# Patient Record
Sex: FEMALE | Race: WHITE
[De-identification: names, ages, dates, MRNs, and addresses within clinical notes are randomized per-mention and may not be internally consistent; named-entity substitution may affect disease eponyms.]

---

## 2017-03-07 ENCOUNTER — HOSPITAL ENCOUNTER (EMERGENCY)
Dept: HOSPITAL 62 - ER | Age: 27
Discharge: HOME | End: 2017-03-07
Payer: MEDICAID

## 2017-03-07 VITALS — DIASTOLIC BLOOD PRESSURE: 79 MMHG | SYSTOLIC BLOOD PRESSURE: 118 MMHG

## 2017-03-07 DIAGNOSIS — J02.9: Primary | ICD-10-CM

## 2017-03-07 DIAGNOSIS — Z88.6: ICD-10-CM

## 2017-03-07 DIAGNOSIS — F17.210: ICD-10-CM

## 2017-03-07 DIAGNOSIS — H92.02: ICD-10-CM

## 2017-03-07 DIAGNOSIS — Z88.8: ICD-10-CM

## 2017-03-07 DIAGNOSIS — Z88.0: ICD-10-CM

## 2017-03-07 DIAGNOSIS — R05: ICD-10-CM

## 2017-03-07 DIAGNOSIS — R68.83: ICD-10-CM

## 2017-03-07 DIAGNOSIS — Z88.2: ICD-10-CM

## 2017-03-07 PROCEDURE — 99282 EMERGENCY DEPT VISIT SF MDM: CPT

## 2017-03-07 NOTE — ER DOCUMENT REPORT
ED General





- General


Chief Complaint: Sore Throat


Stated Complaint: THROAT PAIN


Time seen by provider: 18:20


Mode of Arrival: Ambulatory


Information source: Patient


Notes: 


26-year-old female with 2 day history of subjective fever chills sore throat 

and pain with swallowing.  She reports that she occasionally coughs up some 

material it's a mixture of her clot and white purulent material.  She also 

reports left ear pain for the past 2 days.  Patient denies nausea, vomiting, 

diarrhea, chest pain, abdominal pain, back pain, or dysuria.


Physical Exam:





General: Alert, appears well. 





HEENT: Normocephalic. Atraumatic. PERRLA. Extraocular movements intact left 

tympanic membrane has slight erythema but good landmarks.  Right tympanic 

membranes clear.  Canals clear bilaterally. Oropharynx has bright erythema 

tonsillar swelling and white exudate bilaterally.  No stridor horses drooling.





Neck: Supple.  Mildly tender adenopathy bilaterally no nuchal rigidity





Respiratory: No respiratory distress. Clear and equal breath sounds bilaterally.





Cardiovascular: Regular rate and rhythm. 





Abdominal: Normal Inspection. Soft, non-tender. No distension. Normal Bowel 

Sounds. 





Back: Non-tender. No deformity or step off.





Extremities: Moves all four extremities.


Upper extremities: Normal inspection. Non-tender. Normal color. Normal ROM. 

Normal temperature. 


Lower extremities: Normal inspection. Non-tender. No edema. Normal color. 

Normal ROM. Normal temperature. 





Neurological: Speech clear mentation normal





Psychological: Normal affect. Normal Mood. 





Skin: Warm. Dry. Normal color.


TRAVEL OUTSIDE OF THE [place] IN LAST 30 DAYS: No





- Related Data


Allergies/Adverse Reactions: 


 





aspirin [Aspirin] Allergy (Intermediate, Verified 12/01/15 13:07)


 Facial swelling


diphenhydramine HCl [From Benadryl] Allergy (Intermediate, Verified 12/01/15 13:

07)


 Facial swelling


Sulfa (Sulfonamide Antibiotics) Allergy (Verified 12/01/15 13:07)


 unknown











Past Medical History





- General


Information source: Patient





- Social History


Smoking Status: Current Every Day Smoker


Family History: Hypertension, Malignancy, Thyroid Disfunction, Other - kidney 

disease


Patient has suicidal ideation: No


Patient has homicidal ideation: No





- Past Medical History


Cardiac Medical History: Reports: Hx Hypertension - with pregnancy, Other - 

Patient reports being told she has an enlarged heart and is having further 

follow-up of that with her physician but she doesn't know her diagnosis


Pulmonary Medical History: 


   Denies: Hx Tuberculosis


Renal/ Medical History: Reports: Hx Kidney Stones.  Denies: Hx Peritoneal 

Dialysis


GI Medical History: Reports: Hx Gastroesophageal Reflux Disease


Musculoskeltal Medical History: Reports Hx Musculoskeletal Trauma


Psychiatric Medical History: Reports: Hx Anxiety, Hx Bipolar Disorder


   Denies: Hx Depression


Past Surgical History: Reports: Hx  Section, Hx Gynecologic Surgery, Hx 

Kidney (Renal Surgery)





- Immunizations


Immunizations up to date: Yes


Hx Diphtheria, Pertussis, Tetanus Vaccination: Yes


Hx Pneumococcal Vaccination: 10/01/14





Review of Systems





- Review of Systems


Constitutional: See HPI


EENT: See HPI


Cardiovascular: denies: Chest pain


Respiratory: See HPI


Gastrointestinal: denies: Abdominal pain, Diarrhea, Nausea, Vomiting


Genitourinary: denies: Burning, Dysuria


Female Genitourinary: denies: Pregnant


Musculoskeletal: denies: Back pain


Skin: denies: Rash


Hematologic/Lymphatic: Enlarged lymph nodes, Swollen glands


Neurological/Psychological: denies: Weakness, Numbness





Physical Exam





- Vital signs


Vitals: 


 











Temp Pulse Resp BP Pulse Ox


 


 98.4 F   104 H  14   124/86 H  100 


 


 17 16:54  17 16:54  17 16:54  17 16:54  17 16:54














Course





- Re-evaluation


Re-evalutation: 





17 18:37


Presentation is consistent with acute pharyngitis and given her reported 

cardiac history with treat her with antibiotics even if the strep test was 

negative.  She reports an allergy to penicillin





- Vital Signs


Vital signs: 


 











Temp Pulse Resp BP Pulse Ox


 


 98.4 F   104 H  14   124/86 H  100 


 


 17 16:54  17 16:54  17 16:54  17 16:54  17 16:54














Discharge





- Discharge


Clinical Impression: 


Pharyngitis


Qualifiers:


 Pharyngitis/tonsillitis etiology: unspecified etiology Qualified Code(s): 

J02.9 - Acute pharyngitis, unspecified





Condition: Stable


Disposition: HOME, SELF-CARE


Instructions:  Sore Throat ([facility])


Additional Instructions: 


Follow-up with your physician next week for recheck


Prescriptions: 


Azithromycin [Zithromax 250 mg Tablet] 250 mg PO ASDIR PRN #6 tablet


 PRN Reason:

## 2017-03-07 NOTE — ER DOCUMENT REPORT
ED Medical Screen (RME)





- General


Chief Complaint: Sore Throat


Stated Complaint: THROAT PAIN


Time seen by provider: 17:06


Mode of Arrival: Ambulatory


Information source: Patient


Notes: 


26-year-old female complaining of right-sided sore throat with painful 

swallowing x 2 days  She has also had a fever.


Exudative right tonsil No periTonsillar swelling.





I have greeted and performed a rapid initial assessment of this patient.  A 

comprehensive ED assessment, evaluation of the patient, analysis of test results

, and completion of the medical decision making process will be conducted by 

additional ED providers.


TRAVEL OUTSIDE OF THE [place] IN LAST 30 DAYS: No





- Related Data


Allergies/Adverse Reactions: 


 





aspirin [Aspirin] Allergy (Intermediate, Verified 12/01/15 13:07)


 Facial swelling


diphenhydramine HCl [From Benadryl] Allergy (Intermediate, Verified 12/01/15 13:

07)


 Facial swelling


Sulfa (Sulfonamide Antibiotics) Allergy (Verified 12/01/15 13:07)


 unknown











Past Medical History





- Past Medical History


Cardiac Medical History: Reports: Hx Hypertension - with pregnancy


Pulmonary Medical History: 


   Denies: Hx Tuberculosis


Renal/ Medical History: Reports: Hx Kidney Stones


GI Medical History: Reports: Hx Gastroesophageal Reflux Disease


Musculoskeltal Medical History: Reports Hx Musculoskeletal Trauma


Psychiatric Medical History: Reports: Hx Anxiety, Hx Bipolar Disorder


   Denies: Hx Depression


Past Surgical History: Reports: Hx  Section, Hx Gynecologic Surgery, Hx 

Kidney (Renal Surgery)





- Immunizations


Immunizations up to date: Yes


Hx Diphtheria, Pertussis, Tetanus Vaccination: Yes





Physical Exam





- Vital signs


Vitals: 





 











Temp Pulse Resp BP Pulse Ox


 


 98.4 F   104 H  14   124/86 H  100 


 


 17 16:54  17 16:54  17 16:54  17 16:54  17 16:54














Course





- Vital Signs


Vital signs: 





 











Temp Pulse Resp BP Pulse Ox


 


 98.4 F   104 H  14   124/86 H  100 


 


 17 16:54  17 16:54  17 16:54  17 16:54  17 16:54
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
## 2017-06-01 NOTE — ER DOCUMENT REPORT
HPI





- HPI


Patient complains to provider of: toothache


Pain Level: 3


Context: 


 18-year-old female presents with fracture to 3 days ago with severe pain has 

been taking 800 mg of Motrin every 8 hours with minimal relief in her symptoms.

  Patient denies any fever, chills, drainage, foul odor, aphasia, dyspnea, 

inability to tolerate p.o. fluids or solids. NKDA to PCN





- CARDIOVASCULAR


Cardiovascular: DENIES: Chest pain





- REPRODUCTIVE


LMP: 17


Reproductive: DENIES: Pregnant:





- DERM


Skin Color: Normal, Pink





Past Medical History





- Social History


Smoking Status: Current Every Day Smoker


Chew tobacco use (# tins/day): No


Frequency of alcohol use: None


Drug Abuse: None


Family History: Hypertension, Malignancy, Thyroid Disfunction, Other - kidney 

disease


Patient has suicidal ideation: No


Patient has homicidal ideation: No





- Past Medical History


Cardiac Medical History: Reports: Hx Hypertension - with pregnancy


Pulmonary Medical History: 


   Denies: Hx Tuberculosis


Renal/ Medical History: Reports: Hx Kidney Stones.  Denies: Hx Peritoneal 

Dialysis


GI Medical History: Reports: Hx Gastroesophageal Reflux Disease


Musculoskeltal Medical History: Reports Hx Musculoskeletal Trauma


Psychiatric Medical History: Reports: Hx Anxiety, Hx Bipolar Disorder


   Denies: Hx Depression


Past Surgical History: Reports: Hx  Section, Hx Gynecologic Surgery, Hx 

Kidney (Renal Surgery)





- Immunizations


Immunizations up to date: Yes


Hx Diphtheria, Pertussis, Tetanus Vaccination: Yes


Hx Pneumococcal Vaccination: 10/01/14





Vertical Provider Document





- CONSTITUTIONAL


Agree With Documented VS: Yes


Exam Limitations: No Limitations


General Appearance: WD/WN, No Apparent Distress





- INFECTION CONTROL


TRAVEL OUTSIDE OF THE [place] IN LAST 30 DAYS: No





- HEENT


HEENT: Atraumatic, Normal ENT Exam, Normocephalic


Mouth Diagram: 


  __________________________














  __________________________





 1 - fx down to nerve root F-15 no evidence of abscess





Notes: 


Uvula midline.  Airway patent. No evidence of tonsillar enlargement, 

peritonsillar abscess, retropharyngeal abscess.





- NECK


Neck: Normal Inspection, Other - no Evidence of Willard's angina.  negative: 

Lymphadenopathy-Left, Lymphadenopathy-Right





- RESPIRATORY


Respiratory: Breath Sounds Normal, No Respiratory Distress, Chest Non-Tender


O2 Sat by Pulse Oximetry: 100





- CARDIOVASCULAR


Cardiovascular: Regular Rate, Regular Rhythm, No Murmur





Course





- Re-evaluation


Re-evalutation: 





17 07:30


Received a 5 cc submental Sensorcaine block with complete resolution of her 

symptoms and no complications.  No evidence of peritonsillar retropharyngeal 

abscess.  Receiving prescription for p.o. antibiotics and instruction to follow-

up with dentist





- Vital Signs


Vital signs: 


 











Temp Pulse Resp BP Pulse Ox


 


 98.3 F   74   18   124/84   100 


 


 17 19:47  17 19:47  17 19:47  17 19:47  17 19:47














Procedures





- Additional Procedures


  ** dental block


Additional Procedures: Other - dental block 5 cc sensorcaine buccal block for 

tooth 15





Discharge





- Discharge


Clinical Impression: 


 Tooth ache





Condition: Good


Disposition: HOME, SELF-CARE


Instructions:  [facility], Penicillin V K ([facility]), Oral Narcotic 

Medication ([facility]), Toothache ([facility])


Additional Instructions: 


[facility]


[address], [place]


[phone]


Friday mornings, by appointment





[facility]


[address] [place] [address] [phone]





[facility]


[address]


[phone]





[facility]


[address] [facility]


[phone]





[facility]


[address]


[phone]


[email or web address]





[facility]


[address] [place]  [address] [phone]


Monday-  8:00am to 5:00 pm


Will see patients from other [facility].


Charges based on income and family size and


accepts Medicare, Medicaid, and Insurances


Will pull molars





[facility]


Student Clinics


[facility] [address] [phone]


Hours of Operation


   8:00 am - 4:30 pm weekdays





The following dental offices accept Medicaid:





   [facility]   [phone]


   Dr. Langford         [phone]


   Dr. Flowers         [phone]


   Dr. Sprague         [phone]


   Dr. Arrington         [phone]


   [facility] [address] [place] [place]


      oral surgery      [phone]


   Dr. Soto ([place])      [phone]


   Dr. Madrid ([place])   [phone]


   [facility]      [phone]


   [facility] ([place])   [phone]


   Dr. Saleem ([place])      [phone]


   [facility]      [phone]


   [facility]   [phone]


   [facility]   [phone]


   Dr. Feliciano ([place])      [phone]


   [facility] 


      ([place])      [phone]





   Medicaid Care Line      [phone]


Prescriptions: 


Oxycodone HCl/Acetaminophen [Percocet 5-325 mg Tablet] 1 - 2 tab PO Q4H PRN #15 

tablet


 PRN Reason: 


Penicillin V Potassium [Penicillin Vk 500 mg Tablet] 500 mg PO BID #20 tablet


Referrals: 


LAYLA AGUIRRE MD [Primary Care Provider] - Follow up as needed

## 2017-08-06 NOTE — ER DOCUMENT REPORT
ED ENT





- General


Chief Complaint: throat pain


Stated Complaint: THROAT PAIN,FEVER


Time Seen by Provider: 17 13:23


Notes: 


Patient is a  20 week pregnant 26-year-old female who presents emergency 

department complaining of sore throat for the past 2 days.  Patient admits to 

difficulty swallowing and swollen throat for the past 2 days.  Patient states 

that she has not seen her primary care provider regarding this.  Denies any 

sick contacts at home.  States she is able to tolerate swallowing liquids but 

solids are too painful.  Denies any difficulty breathing.  Has been trying to 

take Tylenol but cannot swallow the pills.  Otherwise patient is on Suboxone 

through pain management.


TRAVEL OUTSIDE OF THE [place] IN LAST 30 DAYS: No





- Related Data


Allergies/Adverse Reactions: 


 





aspirin [Aspirin] Allergy (Intermediate, Verified 17 12:46)


 Facial swelling


diphenhydramine HCl [From Benadryl] Allergy (Intermediate, Verified 17 12:

46)


 Facial swelling


Sulfa (Sulfonamide Antibiotics) Allergy (Verified 17 12:46)


 unknown











Past Medical History





- Social History


Smoking Status: Current Every Day Smoker


Family History: Hypertension, Malignancy, Thyroid Disfunction, Other - kidney 

disease


Patient has suicidal ideation: No


Patient has homicidal ideation: No





- Past Medical History


Cardiac Medical History: Reports: Hx Hypertension - with pregnancy


Pulmonary Medical History: 


   Denies: Hx Tuberculosis


Renal/ Medical History: Reports: Hx Kidney Stones.  Denies: Hx Peritoneal 

Dialysis


GI Medical History: Reports: Hx Gastroesophageal Reflux Disease


Musculoskeltal Medical History: Reports Hx Musculoskeletal Trauma


Psychiatric Medical History: Reports: Hx Anxiety, Hx Bipolar Disorder


   Denies: Hx Depression


Past Surgical History: Reports: Hx  Section, Hx Gynecologic Surgery, Hx 

Kidney (Renal Surgery)





- Immunizations


Immunizations up to date: Yes


Hx Diphtheria, Pertussis, Tetanus Vaccination: Yes


Hx Pneumococcal Vaccination: 10/01/14





Review of Systems





- Review of Systems


Constitutional: No symptoms reported


EENT: See HPI


Cardiovascular: No symptoms reported


Respiratory: No symptoms reported


Gastrointestinal: No symptoms reported


Genitourinary: No symptoms reported


Female Genitourinary: No symptoms reported


Musculoskeletal: No symptoms reported


-: Yes All other systems reviewed and negative





Physical Exam





- Vital signs


Vitals: 


 











Temp Pulse Resp BP Pulse Ox


 


 98.9 F   109 H  16   137/70 H  100 


 


 17 12:40  17 12:40  17 12:40  17 12:40  17 12:40














- Notes


Notes: 


PHYSICAL EXAM


GENERAL: Alert, interacts well. 


HEAD: Normocephalic, atraumatic.


EYES: Pupils equal, round, and reactive to light. Extraocular movements intact.


ENT: Oral mucosa moist, tongue midline.  Uvula midline.  Evidence of pharyngeal 

erythema with tonsillar exudates.  Airway patent. No evidence of tonsillar 

enlargement, peritonsillar abscess, retropharyngeal abscess.


NECK: Full range of motion. Supple. Trachea midline.


LUNGS: Clear to auscultation bilaterally, no wheezes, rales, or rhonchi. No 

respiratory distress.


HEART: Regular rate and rhythm. No murmurs, gallops, or rubs.


ABDOMEN: Soft,  nondistended, nontender. No guarding, rebound, or rigidity.. 

Bowel sounds present in all 4 quadrants.


EXTREMITIES: Moves all 4 extremities spontaneously. No edema, radial and 

dorsalis pedis pulses 2/4 bilaterally. No cyanosis. 


NEUROLOGICAL: Alert and oriented x4. Normal speech.


PSYCH: Normal affect, normal mood.


SKIN: Warm, dry, normal turgor. No rashes or lesions noted.








Course





- Re-evaluation


Re-evalutation: 





17 14:29


Patient is a 26-year-old female hemodynamically stable, no acute distress and 

afebrile.  Patient tested positive for strep on her rapid strep test.  Patient 

tolerating fluids without any difficulty no concern at this time for 

retropharyngeal or peritonsillar abscess.  Airway is patent.  Patient treated 

with penicillin G IM and discharged home to take over-the-counter Tylenol for 

pain.  Patient given strict return precautions and otherwise to follow-up with 

the health department.  Patient agrees with plan.





- Vital Signs


Vital signs: 


 











Temp Pulse Resp BP Pulse Ox


 


 98.9 F   109 H  16   137/70 H  100 


 


 17 12:40  17 12:40  17 12:40  17 12:40  17 12:40














Discharge





- Discharge


Clinical Impression: 


 Strep pharyngitis





Condition: Good


Disposition: HOME, SELF-CARE


Instructions:  Strep Throat ([facility])


Additional Instructions: 


You have been treated with a dose of antibiotics in shot form which is all you 

will require for treatment


Please return to emergency department if your symptoms worsen, difficulty 

tolerating solids and liquids.  Please take your medications as prescribed


Prescriptions: 


Lidocaine HCl [Xylocaine Viscous] 5 ml PO Q6H PRN #100 ml


 PRN Reason: 


Referrals: 


HEALTH DEPT,[facility] [NO LOCAL MD] - Follow up in 3-5 days (For an OB 

appointment)

## 2017-09-30 NOTE — RADIOLOGY REPORT (SQ)
EXAM DESCRIPTION:  CHEST SINGLE VIEW



COMPLETED DATE/TIME:  9/30/2017 4:18 am



REASON FOR STUDY:  fever, cough



COMPARISON:  6.7.16



EXAM PARAMETERS:  NUMBER OF VIEWS: One view.

TECHNIQUE: Single frontal radiographic view of the chest acquired.

RADIATION DOSE: NA

LIMITATIONS: None.



FINDINGS:  LUNGS AND PLEURA: No opacities, masses or pneumothorax. No pleural effusion.

MEDIASTINUM AND HILAR STRUCTURES: No masses.  Contour normal.

HEART AND VASCULAR STRUCTURES: Heart normal in size.  Normal vasculature.

BONES: No acute findings.

HARDWARE: None in the chest.

OTHER: No other significant finding.



IMPRESSION:  NO ACUTE RADIOGRAPHIC FINDING IN THE CHEST.



TECHNICAL DOCUMENTATION:  JOB ID:  [id number]

## 2017-09-30 NOTE — ER DOCUMENT REPORT
ED Fever





- General


Chief Complaint: Fever


Stated Complaint: POSSIBLE FEVER AND WEAKNESS


Time Seen by Provider: 17 00:48


Notes: 


The patient is a 26-year-old female, past medical history multiple UTIs, 

history of prescription drug abuse (being tapered off Suboxone now), D&C for 

fetus without heart beat 1 month ago, presents with 4 days of fever up to 103.  

She is taking Tylenol every 4 hours with improvement of her fever to 101.  She 

has not had much to eat or drink due to fatigue.  She denies history of IVDA, 

rash, nausea, vomiting, abdominal pain, neck stiffness, headache, sore throat, 

chest pain, shortness of breath, cough, recent travel, dysuria, flank pain, 

diarrhea or back pain.


TRAVEL OUTSIDE OF THE [place] IN LAST 30 DAYS: No





- Related Data


Allergies/Adverse Reactions: 


 





aspirin [Aspirin] Allergy (Intermediate, Verified 17 12:46)


 Facial swelling


diphenhydramine HCl [From Benadryl] Allergy (Intermediate, Verified 17 12:

46)


 Facial swelling


Sulfa (Sulfonamide Antibiotics) Allergy (Verified 17 12:46)


 unknown











Past Medical History





- General


Information source: Patient





- Social History


Smoking Status: Current Every Day Smoker


Frequency of alcohol use: None


Drug Abuse: Prescription drugs - in the past


Family History: Hypertension, Malignancy, Thyroid Disfunction, Other - kidney 

disease


Patient has suicidal ideation: No


Patient has homicidal ideation: No





- Past Medical History


Cardiac Medical History: Reports: Hx Hypertension - with pregnancy


Pulmonary Medical History: 


   Denies: Hx Tuberculosis


Renal/ Medical History: Reports: Hx Kidney Stones.  Denies: Hx Peritoneal 

Dialysis


GI Medical History: Reports: Hx Gastroesophageal Reflux Disease


Musculoskeltal Medical History: Reports Hx Musculoskeletal Trauma


Psychiatric Medical History: Reports: Hx Anxiety, Hx Bipolar Disorder


   Denies: Hx Depression


Past Surgical History: Reports: Hx  Section, Hx Gynecologic Surgery, Hx 

Kidney (Renal Surgery)





- Immunizations


Immunizations up to date: Yes


Hx Diphtheria, Pertussis, Tetanus Vaccination: Yes


Hx Pneumococcal Vaccination: 10/01/14





Review of Systems





- Review of Systems


Notes: 


REVIEW OF SYSTEMS:


CONSTITUTIONAL: +fevers, -chills


EENT: -eye pain, -difficulty swallowing, -nasal congestion


CARDIOVASCULAR:-chest pain, -syncope.


RESPIRATORY: -cough, -SOB


GASTROINTESTINAL:  -abdominal pain, -nausea, -vomiting, -diarrhea


GENITOURINARY: -dysuria, -hematuria


MUSCULOSKELETAL: -back pain, -neck pain


SKIN: -rash or skin lesions.


HEMATOLOGIC: -easy bruising or bleeding.


LYMPHATIC: -swollen, enlarged glands.


NEUROLOGICAL: -altered mental status or loss of consciousness, -headache, -

neurologic symptoms


PSYCHIATRIC: -anxiety, -depression.


ALL OTHER SYSTEMS REVIEWED AND NEGATIVE.





Physical Exam





- Vital signs


Vitals: 


 











Temp Pulse BP Pulse Ox


 


 101.3 F H  118 H  122/78   98 


 


 17 00:15  17 00:15  17 00:15  17 00:15














- Notes


Notes: 


PHYSICAL EXAMINATION:





GENERAL: Well-appearing, well-nourished and in no acute distress.





HEAD: Atraumatic, normocephalic.





EYES: Pupils equal round and reactive to light, extraocular movements intact, 

sclera anicteric, conjunctiva are normal.





ENT: nares patent, oropharynx clear without exudates.  Moist mucous membranes.





NECK: Normal range of motion, supple without lymphadenopathy, no neck stiffness





LUNGS: Breath sounds clear to auscultation bilaterally and equal.  No wheezes 

rales or rhonchi.





HEART: Tachycardia, regular rhythm





ABDOMEN: Soft, nontender, normoactive bowel sounds.  No guarding, no rebound.  

No masses appreciated.





EXTREMITIES: Normal range of motion, no pitting or edema.  No cyanosis.





NEUROLOGICAL: Cranial nerves grossly intact.  Normal speech, normal gait.  

Normal sensory and motor exams.





PSYCH: Normal mood, normal affect.





SKIN: Warm, Dry, normal turgor, no rashes or lesions noted.





Course





- Re-evaluation


Re-evalutation: 


Patient appears very well.  Her chest x-ray does not show any signs of 

pneumonia.  Lactate and blood pressure are normal.  She has no history of IVDA 

and no murmur heard on exam to suggest endocarditis.  Patient has dysuria with 

some WBCs.  We will treat her with Macrobid and have her follow-up with her 

primary care physician tomorrow for further evaluation and treatment.  Given 

very strict return precautions and she understands.





- Vital Signs


Vital signs: 


 











Temp Pulse Resp BP Pulse Ox


 


 101.8 F H  101 H  16   119/62   100 


 


 17 04:12  17 04:12  17 04:12  17 04:12  17 04:12














- Laboratory


Result Diagrams: 


 17 00:40





 17 00:40


Laboratory results interpreted by me: 


 











  17





  00:40 00:40 00:40


 


WBC  13.5 H  


 


Hgb  11.2 L  


 


Hct  33.3 L  


 


MCV  79 L  


 


MCH  26.4 L  


 


RDW  17.5 H  


 


Seg Neutrophils %  82.0 H  


 


Lymphocytes %  6.8 L  


 


Absolute Neutrophils  11.0 H  


 


Absolute Monocytes  1.5 H  


 


VBG pH    7.49 H


 


VBG pCO2    34.6 L


 


Sodium   135.8 L 


 


Potassium   3.1 L 


 


BUN   6 L 


 


Glucose   121 H 


 


Direct Bilirubin   0.5 H 


 


Lipase   22.7 L 


 


Urine Protein   


 


Urine Ketones   


 


Urine Blood   


 


Urine Urobilinogen   


 


Ur Leukocyte Esterase   














  17





  04:05


 


WBC 


 


Hgb 


 


Hct 


 


MCV 


 


MCH 


 


RDW 


 


Seg Neutrophils % 


 


Lymphocytes % 


 


Absolute Neutrophils 


 


Absolute Monocytes 


 


VBG pH 


 


VBG pCO2 


 


Sodium 


 


Potassium 


 


BUN 


 


Glucose 


 


Direct Bilirubin 


 


Lipase 


 


Urine Protein  30 H


 


Urine Ketones  20 H


 


Urine Blood  MODERATE H


 


Urine Urobilinogen  4.0 H


 


Ur Leukocyte Esterase  TRACE H














- Diagnostic Test


Radiology reviewed: Image reviewed, Reports reviewed


Radiology results interpreted by me: 


CXR: NAD





Discharge





- Discharge


Clinical Impression: 


 Dysuria





Fever


Qualifiers:


 Fever type: unspecified Qualified Code(s): R50.9 - Fever, unspecified





Condition: Stable


Disposition: HOME, SELF-CARE


Additional Instructions: 


FEVER:





     Fever is the body's reaction to infection.  Fever can also occur with 

illnesses that create fever-producing substances in the body.  By itself, fever 

is not harmful.  It helps the body fight invading germs. We are more concerned 

with: 


(1) What's causing the fever? 


(2) How can we keep you more comfortable until the fever goes away?


     Early in an illness, symptoms are often so vague that a diagnosis can't be 

made.  If the doctor hasn't identified a clear cause for your fever, you will 

probably develop new symptoms within the next two days. Contact the doctor if 

you develop severe worsening headache, rash, chest pain, cough with yellow or 

green sputum, difficulty breathing, abdominal pain, or other new symptoms.


     There is no reason to treat a fever if you're comfortable.  If the fever 

is causing aches, headache, and fatigue, you can treat it with ibuprofen (Advil

, Nuprin, etc) or acetaminophen (Tylenol). Follow the directions on the bottle.


     Get plenty of liquids (three quarts per day).  Rest.  Physical work or 

sports will raise the temperature higher and make you feel much worse.  Dress 

lightly.


     If you're chilling, this means the temperature is trying to go higher.  

Take ibuprofen or acetaminophen.  When you feel sweaty and "feverish" the 

temperature is coming down.


     If the fever doesn't go away within two days or if you become more ill, 

call the doctor or return at once for re-examination.





NORMAL EXAM AND WORKUP:


     At this time, with the exception of fever, your examination and workup 

show no significant abnormality.  No significant abnormal physical findings 

were noted.  All laboratory, EKG, and imaging (x-ray, CT scans, ultrasound) 

studies that were ordered show no significant abnormality.


     Although your examination and all studies that were ordered showed no 

significant abnormal finding, there are no examinations and no studies that are 

100% accurate.  There is always the possibility that some abnormality could 

exist and not be detected with physical examination or within the limits and 

capabilities of laboratory and other studies.


     You should return or follow up as you were instructed on your visit today 

for further evaluation if your symptoms do not resolve.








VIRAL SYNDROME:


     The physician has diagnosed a likely viral infection.  Viruses not only 

cause "colds," but can cause many different symptoms including generalized 

aching, fever, headache, cough, diarrhea, nausea, vomiting, and fatigue.


     The treatment, for the most part, is simply relief of symptoms. This means 

that antibiotics are usually not given.  Rest, fluids, pain medications and, 

occasionally, medication for the specific symptoms that are most bothersome 

will be prescribed. Use good handwashing to avoid passing the virus to others. 

Shared toys should be cleaned with disinfectant. Clean the toilets, sinks, and 

counter surfaces in bathrooms. Launder clothing in hot water.


     Contact the physician if you develop any new or unusual symptoms such as 

severe headache, stiff neck, high fever, chest pain, productive cough, or 

shortness of breath.  You should be rechecked if you don't see marked 

improvement within seven to 10 days.








USE OF ACETAMINOPHEN (Tylenol):


     Acetaminophen may be taken for pain relief or fever control. It's much 

safer than aspirin, offering a wider range of "safe" dosages.  It is safe 

during pregnancy.  Some brand names are Tylenol, Panadol, Datril, Anacin 3, 

Tempra, and Liquiprin. Acetaminophen can be repeated every four hours.  The 

following are maximum recommended dosages:





WEIGHT         Dose             Drops                  Elixir        Chewable(

80mg)


(LBS.)                            drprs=droppers    tsp=teaspoon


6               40 mg            0.4 ml (1/2)


6-11            80 mg            0.8 ml (full)              tsp               

   1       tab


12-16         120 mg           1 1/2 drprs             3/4  tsp               1 

1/2  tabs


17-23         160 mg             2  drprs             1    tsp                 

  2       tabs


24-30         240 mg             3  drprs             1 1/2 tsp                

3       tabs


30-35         320 mg                                       2    tsp            

       4       tabs


36-41         360 mg                                       2 1/4   tsp         

     4 1/2 tabs


42-47         400 mg                                       2 1/2   tsp         

     5      tabs


48-53         480 mg                                       3    tsp            

       6      tabs


54-59         520 mg                                       3  1/4  tsp         

     6 1/2 tabs


60-64         560 mg                                       3  1/2  tsp         

     7      tabs 


65-70         600 mg                                       3  3/4  tsp         

     7 1/2 tabs


71-76         640 mg                                       4   tsp             

      8      tabs


77-82         720 mg                                       4 1/2   tsp         

    9      tabs


83-88         800 mg                                       5   tsp             

    10      tabs





>89 pounds or adults          650 mg to 900 mg





Acetaminophen can be repeated every four hours.  Maximum dose not to exceed 

4000 mg a day.





   These maximum recommended dosages are slightly higher than the dosages 

written on the product container, but these dosages are very safe and below the 

toxic dosage for acetaminophen.








FOLLOW-UP CARE:


If you have been referred to a physician for follow-up care, call the physician

s office for an appointment as you were instructed or within the next two days.

  If you experience worsening or a significant change in your symptoms, notify 

the physician immediately or return to the Emergency Department at any time for 

re-evaluation.





URINARY TRACT INFECTION:





     Your evaluation indicates that you have a urinary tract infection. This is 

due to germs growing in the bladder.  This is a common problem.


     This infection usually responds quickly to antibiotics.  Your antibiotic 

should be taken exactly as prescribed.  Drink plenty of fluids -- three to four 

quarts a day.


     Occasionally, a bladder anesthetic will be prescribed to help stop the 

feeling of urgency until the antibiotic has a chance to clear the infection.  

This may cause your urine to be dark orange.


     Certain urine infections require a culture.  If the doctor obtained a 

culture, the results will be back in two days.  You should call to see if a 

change in treatment is needed.


     A repeat urinalysis after you finish treatment is often recommended.  The 

physician will let you know if further testing is required.


     Call the doctor if you develop fever, chills, flank pain, inability to 

urinate, or blood in the urine.








ANTIBIOTIC THERAPY:


     You have been given an antibiotic prescription.  It's important that you 

take all the medication, unless instructed otherwise by your physician.  

Failure to complete the entire course can result in relapse of your condition.


     Common side effects of antibiotics include nausea, intestinal cramping, or 

diarrhea.  Women may develop vaginal yeast infections, and babies can get yeast 

(thrush) in the mouth following the use of antibiotics.  Contact your physician 

if you develop significant side effects from this medication.


     Allergy to this antibiotic can result in hives, wheezing, faintness, or 

itching.  If symptoms of allergy occur, stop the medication and call the doctor.





NITROFURANTOIN (MACRODANTIN, MACROBID):


     You have received a prescription for nitrofurantoin (Macrodantin). This 

antibiotic is used for urinary tract infections.





Women who are pregnant or nursing should notify the physician before taking 

this medicine.  If you have ever had a problem caused by this medication in the 

past, be sure the physician is aware of it.


     Common side effects of this medicine include nausea, vomiting, or 

decreased appetite.  Notify your physician if these side effects become severe.


     Immediately stop this medicine and call the physician if you develop cough

, shortness of breath, chest pain, weakness, jaundice (yellow color of the skin 

and whites of the eyes), or a skin rash.








FOLLOW-UP CARE:


If you have been referred to a physician for follow-up care, call the physician

s office for an appointment as you were instructed or within the next two days.

  If you experience worsening or a significant change in your symptoms, notify 

the physician immediately or return to the Emergency Department at any time for 

re-evaluation.


Prescriptions: 


Nitrofurantoin/Nitrofuran Mac [Macrobid 100 mg Capsule] 1 tab PO BID #10 capsule


Referrals: 


BREANNA PAUL MD [Primary Care Provider] - Follow up as needed

## 2017-10-01 NOTE — ER DOCUMENT REPORT
ED Medical Screen (RME)





- General


Stated Complaint: FEVER


Time Seen by Provider: 10/01/17 10:47


Mode of Arrival: Wheelchair


Information source: Patient


TRAVEL OUTSIDE OF THE [place] IN LAST 30 DAYS: No





- HPI


Patient complains to provider of: Fever


Notes: 





10/01/17 10:52


Patient is a 26-year-old female presenting to the emergency room today 

complaining of fever 1 weeks duration with body aches, nausea, intermittent 

chest pain, seen in this emergency room on 2017 for similar complaints, 

states that she has not been feeling any better despite taking antibiotics that 

were prescribed





- Related Data


Allergies/Adverse Reactions: 


 





aspirin [Aspirin] Allergy (Intermediate, Verified 17 12:46)


 Facial swelling


diphenhydramine HCl [From Benadryl] Allergy (Intermediate, Verified 17 12:

46)


 Facial swelling


Sulfa (Sulfonamide Antibiotics) Allergy (Verified 17 12:46)


 unknown











Past Medical History





- Past Medical History


Cardiac Medical History: Reports: Hx Hypertension - with pregnancy


Pulmonary Medical History: 


   Denies: Hx Tuberculosis


Renal/ Medical History: Reports: Hx Kidney Stones.  Denies: Hx Peritoneal 

Dialysis


GI Medical History: Reports: Hx Gastroesophageal Reflux Disease


Musculoskeltal Medical History: Reports Hx Musculoskeletal Trauma


Psychiatric Medical History: Reports: Hx Anxiety, Hx Bipolar Disorder


   Denies: Hx Depression


Past Surgical History: Reports: Hx  Section, Hx Gynecologic Surgery, Hx 

Kidney (Renal Surgery)





- Immunizations


Immunizations up to date: Yes


Hx Diphtheria, Pertussis, Tetanus Vaccination: Yes





Physical Exam





- Vital signs


Vitals: 





 











Temp Pulse Resp BP Pulse Ox


 


 101.2 F H  119 H  16   148/81 H  99 


 


 10/01/17 10:51  10/01/17 10:51  10/01/17 10:51  10/01/17 10:51  10/01/17 10:51














Course





- Vital Signs


Vital signs: 





 











Temp Pulse Resp BP Pulse Ox


 


 101.2 F H  119 H  16   148/81 H  99 


 


 10/01/17 10:51  10/01/17 10:51  10/01/17 10:51  10/01/17 10:51  10/01/17 10:51

## 2017-10-01 NOTE — EKG REPORT
SEVERITY:- ABNORMAL ECG -

SINUS RHYTHM

REPOL ABNRM SUGGESTS ISCHEMIA, ANT-LAT LEADS

:

Confirmed by: Donnie Espinoza 01-Oct-2017 12:57:23

## 2017-10-01 NOTE — RADIOLOGY REPORT (SQ)
EXAM DESCRIPTION:  CHEST PA/LAT



COMPLETED DATE/TIME:  10/1/2017 2:41 pm



REASON FOR STUDY:  fever



COMPARISON:  [facility] chest 6/7/2016



EXAM PARAMETERS:  NUMBER OF VIEWS: two views

TECHNIQUE: Digital Frontal and Lateral radiographic views of the chest acquired.

RADIATION DOSE: NA

LIMITATIONS: none



FINDINGS:  LUNGS AND PLEURA: No opacities, masses or pneumothorax. No pleural effusion.

MEDIASTINUM AND HILAR STRUCTURES: No masses or contour abnormalities.

HEART AND VASCULAR STRUCTURES: Heart normal size.  No evidence for failure.

BONES: No acute findings.

HARDWARE: None in the chest.

OTHER: No other significant finding.



IMPRESSION:  NO SIGNIFICANT RADIOGRAPHIC FINDING IN THE CHEST.



TECHNICAL DOCUMENTATION:  JOB ID:  [id number]

 2011 [employer]- All Rights Reserved

## 2017-10-01 NOTE — ER DOCUMENT REPORT
ED General





- General


Chief Complaint: Fever


Stated Complaint: FEVER


Time Seen by Provider: 10/01/17 10:47


Mode of Arrival: Wheelchair


Information source: Patient


Notes: 





This is a 26-year-old female who presents to the emergency room with nausea, 

vomiting, fever, weakness, fatigue for the past 1 week.  Patient does state she 

is has a history of narcolepsy but is currently on no medicines.  She is 

 4 para 3 and had a termination of pregnancy 1 month ago in [place] 

for fetal demise.  She states that she did well after that termination and has 

not had any abdominal pain.  She does take Suboxone (half a day).  She was in 

the emergency room 1 day ago and was placed on nitrofurantoin for UTI.  She 

presents with persistent symptoms.


TRAVEL OUTSIDE OF THE [place] IN LAST 30 DAYS: No





- HPI


Onset: Last week


Onset/Duration: Gradual


Quality of pain: Dull


Severity: Moderate


Pain Level: 2


Associated symptoms: Diarrhea, Fever, Nausea, Vomiting.  denies: Chest pain, 

Shortness of breath


Exacerbated by: Denies


Relieved by: Denies


Similar symptoms previously: Yes


Recently seen / treated by doctor: Yes





- Related Data


Allergies/Adverse Reactions: 


 





aspirin [Aspirin] Allergy (Intermediate, Verified 17 12:46)


 Facial swelling


diphenhydramine HCl [From Benadryl] Allergy (Intermediate, Verified 17 12:

46)


 Facial swelling


Sulfa (Sulfonamide Antibiotics) Allergy (Verified 17 12:46)


 unknown











Past Medical History





- General


Information source: Patient





- Social History


Smoking Status: Current Every Day Smoker


Cigarette use (# per day): No


Chew tobacco use (# tins/day): No


Frequency of alcohol use: None


Drug Abuse: None


Lives with: Family


Family History: Hypertension, Malignancy, Thyroid Disfunction, Other - kidney 

disease


Patient has suicidal ideation: No


Patient has homicidal ideation: No





- Past Medical History


Cardiac Medical History: Reports: Hx Hypertension - with pregnancy


Pulmonary Medical History: 


   Denies: Hx Tuberculosis


Renal/ Medical History: Reports: Hx Kidney Stones.  Denies: Hx Peritoneal 

Dialysis


GI Medical History: Reports: Hx Gastroesophageal Reflux Disease


Musculoskeltal Medical History: Reports Hx Musculoskeletal Trauma


Psychiatric Medical History: Reports: Hx Anxiety, Hx Bipolar Disorder


   Denies: Hx Depression


Past Surgical History: Reports: Hx  Section, Hx Gynecologic Surgery, Hx 

Kidney (Renal Surgery)





- Immunizations


Immunizations up to date: Yes


Hx Diphtheria, Pertussis, Tetanus Vaccination: Yes


Hx Pneumococcal Vaccination: 10/01/14





Review of Systems





- Review of Systems


Constitutional: Chills, Fever


EENT: No symptoms reported


Cardiovascular: No symptoms reported


Respiratory: Cough


Gastrointestinal: See HPI


Genitourinary: See HPI


Female Genitourinary: No symptoms reported.  denies: Vaginal discharge - Is, 

Vaginal bleeding, Vaginal odor


Musculoskeletal: No symptoms reported


Skin: No symptoms reported


Hematologic/Lymphatic: No symptoms reported


Neurological/Psychological: See HPI





Physical Exam





- Vital signs


Vitals: 


 











Temp Pulse Resp BP Pulse Ox


 


 101.2 F H  119 H  16   148/81 H  99 


 


 10/01/17 10:51  10/01/17 10:51  10/01/17 10:51  10/01/17 10:51  10/01/17 10:51











Notes: 


Physical exam:


 


GENERAL: Does not appear to be in significant distress.  She does appear 

comfortable.





HEAD: Atraumatic, normocephalic.





EYES: Pupils equal round and reactive to light, extraocular movements intact, 

sclera anicteric, conjunctiva are normal.





ENT: TMs normal, nares patent, oropharynx clear without exudates.  Moist mucous 

membranes.





NECK: Normal range of motion, supple without obvious mass or JVD.





LUNGS: Breath sounds clear to auscultation bilaterally and equal.  No wheezes 

rales or rhonchi.





HEART: Regular rate and rhythm without murmurs, rubs or gallops.





ABDOMEN: Soft, normoactive bowel sounds.  No tenderness to palpation.  No 

guarding, no rebound.  No masses appreciated.





EXTREMITIES: Normal range of motion, no pitting or edema.  No clubbing or 

cyanosis.





NEUROLOGICAL: There is no photophobia, neck stiffness or any evidence of 

meningeal irritation.  Cranial nerves II through XII grossly intact.  Normal 

speech, moving all extremities.





PSYCH: Normal mood, normal affect.





SKIN: Warm, Dry, normal turgor, no rashes or lesions noted.  26-year-old female

, alert and oriented 3,








Course





- Re-evaluation


Re-evalutation: 





10/01/17 14:54


Note: Patient is hemodynamically stable and distress.  She does get tearful at 

times but clinically, she appears okay.  The urine culture from 2 days ago is 

positive for gram-negative rods.  Clinically, the patient does not appear 

septic.  While we are awaiting further labs, we will give her some IV 

ceftriaxone and continue to observe in the ER.


10/01/17 19:29


On repeat exam, patient is comfortable, vital signs stable, she looks good.





- Vital Signs


Vital signs: 


 











Temp Pulse Resp BP Pulse Ox


 


 98.5 F   63   20   105/62   100 


 


 10/01/17 17:05  10/01/17 17:05  10/01/17 17:05  10/01/17 17:05  10/01/17 17:05














- Laboratory


Result Diagrams: 


 10/01/17 11:30





 10/01/17 11:30


Laboratory results interpreted by me: 


 











  10/01/17 10/01/17





  11:30 11:30


 


Hgb  10.9 L 


 


Hct  32.7 L 


 


MCV  79 L 


 


MCH  26.4 L 


 


RDW  17.1 H 


 


Lymphocytes %  12.0 L 


 


Potassium   3.2 L


 


BUN   5 L


 


Creatinine   0.49 L


 


Glucose   133 H


 


Direct Bilirubin   0.5 H














- Diagnostic Test


Radiology reviewed: Image reviewed, Reports reviewed - Chest x-ray shows no 

infiltrates or effusions





Discharge





- Discharge


Clinical Impression: 


 UTI





Condition: Stable


Disposition: HOME, SELF-CARE


Instructions:  Fever ([facility]), Urinary Tract Infection ([facility])


Additional Instructions: 


Thank you for choosing [facility] for your care.  The examination 

and treatment you have received in the Emergency Department today has been 

rendered on an emergency basis only and is not intended to be a substitute for 

complete medical care.  You should contact your follow-up physician as it is 

important that he or she examine you for any new or remaining problems.  If 

given a copy of any lab tests or radiology reports, please bring them with you 

when you see your physician.





If your problem worsens or new symptoms appear and you are unable to arrange 

prompt follow-up care, return to the Emergency Department.  





Specific signs to look out for:


Worsening pain, any concerns or getting worse.  





Any other instructions:


Rest, drink plenty of fluids, take antibiotics as previously as prescribed.


Take the Phenergan for nausea


The Norco is a narcotic.


The pain medicine you're taking prescribed as a narcotic.  There are several 

important things you should know about this medicine:





1.  This medicine contains Tylenol: It is important that you do not take 

Tylenol (or acetaminophen) while on this medicine.  


Tylenol is metabolized by the liver and taking too much Tylenol (acetaminophen) 

can lay to liver damage and even liver failure.





2.  Taking narcotics for too long can lead to physical and mental dependence.  

Take this medicine only if really needed and in the lowest quantity to achieve 

pain relief.





3.  Do not drink alcohol while on this medicine.  Alcohol interacts with 

narcotics and the combination can be dangerous.





4.  Do not drive or operate machinery while on this medicine.





5.  Narcotics do cause constipation, so drink plenty of fluids and daily stool 

softeners.








 


Prescriptions: 


Promethazine HCl [Phenergan 25 mg Tablet] 25 mg PO Q6H PRN #15 tablet


 PRN Reason: 


Referrals: 


DENIZ MAYA DO [NO LOCAL MD] - Follow up in 3-5 days

## 2017-11-19 NOTE — ER DOCUMENT REPORT
HPI





- HPI


Patient complains to provider of: Sore throat


Onset: Other - 3 days


Onset/Duration: Persistent


Quality of pain: Stabbing


Pain Level: 3


Context: 





Patient presents with sore throat for the past 3 days.  Patient does report 

fever off and on.  Patient denies any cough or cold symptoms.


Associated Symptoms: Fever, Sore throat.  denies: Nonproductive cough, 

Productive cough, Earache


Exacerbated by: Denies


Relieved by: Denies


Similar symptoms previously: No


Recently seen / treated by doctor: No





- ROS


ROS below otherwise negative: Yes


Systems Reviewed and Negative: Yes All other systems reviewed and negative





- CONSTITUTIONAL


Constitutional: REPORTS: Fever, Chills





- EENT


EENT: REPORTS: Sore Throat.  DENIES: Ear Pain, Eye problems





- CARDIOVASCULAR


Cardiovascular: DENIES: Chest pain





- RESPIRATORY


Respiratory: DENIES: Trouble Breathing, Coughing





- GASTROINTESTINAL


Gastrointestinal: DENIES: Abdominal Pain, Nausea, Patient vomiting





- URINARY


Urinary: DENIES: Dysuria, Urgency, Frequency





- REPRODUCTIVE


Reproductive: DENIES: Pregnant:





- MUSCULOSKELETAL


Musculoskeletal: DENIES: Extremity pain





- DERM


Skin Color: Normal


Skin Problems: None





Past Medical History





- General


Information source: Patient





- Social History


Smoking Status: Current Every Day Smoker


Smoking Education Provided: Yes


Frequency of alcohol use: None


Drug Abuse: None


Occupation: none


Lives with: Family


Family History: Hypertension, Malignancy, Thyroid Disfunction, Other - kidney 

disease


Patient has suicidal ideation: No


Patient has homicidal ideation: No





- Medical History


Medical History: Other - Narcolepsy





- Past Medical History


Cardiac Medical History: Reports: Hx Hypertension - with pregnancy


Pulmonary Medical History: 


   Denies: Hx Tuberculosis


Renal/ Medical History: Reports: Hx Kidney Stones.  Denies: Hx Peritoneal 

Dialysis


GI Medical History: Reports: Hx Gastroesophageal Reflux Disease


Musculoskeltal Medical History: Reports Hx Musculoskeletal Trauma


Psychiatric Medical History: Reports: Hx Anxiety, Hx Bipolar Disorder


   Denies: Hx Depression


Past Surgical History: Reports: Hx  Section, Hx Gynecologic Surgery, Hx 

Kidney (Renal Surgery)





- Immunizations


Immunizations up to date: Yes


Hx Diphtheria, Pertussis, Tetanus Vaccination: Yes


Hx Pneumococcal Vaccination: 10/01/14





Vertical Provider Document





- CONSTITUTIONAL


Agree With Documented VS: Yes


Exam Limitations: No Limitations


General Appearance: WD/WN, No Apparent Distress





- INFECTION CONTROL


TRAVEL OUTSIDE OF THE [place] IN LAST 30 DAYS: No





- HEENT


HEENT: Atraumatic, Normocephalic, Pharyngeal Exudate, Pharyngeal Tenderness, 

Pharyngeal Erythema





- NECK


Neck: Lymphadenopathy-Left, Lymphadenopathy-Right





- RESPIRATORY


Respiratory: Breath Sounds Normal, No Respiratory Distress


O2 Sat by Pulse Oximetry: 99





- CARDIOVASCULAR


Cardiovascular: Regular Rate, Regular Rhythm, No Murmur





- BACK


Back: Normal Inspection





- MUSCULOSKELETAL/EXTREMETIES


Musculoskeletal/Extremeties: MAEW





- NEURO


Level of Consciousness: Awake, Alert, Appropriate


Motor/Sensory: No Motor Deficit





- DERM


Integumentary: Warm, Dry, No Rash





Course





- Vital Signs


Vital signs: 


 











Temp Pulse Resp BP Pulse Ox


 


 99.4 F   107 H  16   131/83 H  99 


 


 17 18:22  17 18:22  17 18:22  17 18:22  17 18:22














Discharge





- Discharge


Clinical Impression: 


 Tonsillitis





Condition: Stable


Disposition: HOME, SELF-CARE


Instructions:  Corticosteroid Medication ([facility]), Use of Over-The-Counter 

Ibuprofen ([facility]), Sore Throat ([facility]), Tonsillitis ([facility])


Additional Instructions: 


Return immediately for any new or worsening symptoms





Followup with your primary care provider, call tomorrow to make a followup 

appointment








Prescriptions: 


Ibuprofen [Motrin 600 Mg Tablet] 600 mg PO Q6H PRN #20 tablet


 PRN Reason: for pain


Referrals: 


BREANNA PAUL MD [NO LOCAL MD] - Follow up as needed

## 2018-03-11 NOTE — ER DOCUMENT REPORT
ED General





- General


Chief Complaint: Fever


Stated Complaint: VAGINAL BLEEDING


Time Seen by Provider: 18 15:48


Mode of Arrival: Ambulatory


Information source: Patient


Notes: 





28 yo female 16 weeks pregnant woke up with right sided sore throat yesterday, 

was in bed, fever all day. Today had some spotting this morning after urination

, also in the bathtub. Right low back pain (points to below the CVA). Exudative 

right tonsil. 


TRAVEL OUTSIDE OF THE [place] IN LAST 30 DAYS: No





- HPI


Onset: Yesterday





- Related Data


Allergies/Adverse Reactions: 


 





aspirin [Aspirin] Allergy (Intermediate, Verified 18 14:49)


 Facial swelling


diphenhydramine HCl [From Benadryl] Allergy (Intermediate, Verified 18 14:

49)


 Facial swelling


Sulfa (Sulfonamide Antibiotics) Allergy (Verified 18 14:49)


 unknown











Past Medical History





- General


Information source: Patient - 28 yo female 16 weeks pregnant woke up with right 

sided sore throat yesterday, was in bed, fever all day. Today had some spotting 

this morning after urination, also in the bathtub. Right low back pain (below 

the CVA). Exudative right tonsil- no a abscess.





- Social History


Smoking Status: Current Every Day Smoker


Chew tobacco use (# tins/day): No


Frequency of alcohol use: None


Drug Abuse: None


Family History: Hypertension, Malignancy, Thyroid Disfunction, Other - kidney 

disease


Patient has suicidal ideation: No


Patient has homicidal ideation: No





- Past Medical History


Cardiac Medical History: Reports: Hx Hypertension - with pregnancy


Pulmonary Medical History: 


   Denies: Hx Tuberculosis


Renal/ Medical History: Reports: Hx Kidney Stones, Other - Pyelonephritis 

during 1 of her other pregnancies.  Denies: Hx Peritoneal Dialysis


GI Medical History: Reports: Hx Gastroesophageal Reflux Disease


Musculoskeltal Medical History: Reports Hx Musculoskeletal Trauma


Psychiatric Medical History: Reports: Hx Anxiety, Hx Bipolar Disorder


Past Surgical History: Reports: Hx  Section, Hx Gynecologic Surgery, Hx 

Kidney (Renal Surgery)





- Immunizations


Immunizations up to date: Yes


Hx Diphtheria, Pertussis, Tetanus Vaccination: Yes


Hx Pneumococcal Vaccination: 10/01/14





Review of Systems





- Review of Systems


Constitutional: See HPI


EENT: See HPI


Cardiovascular: No symptoms reported


Respiratory: No symptoms reported


Gastrointestinal: No symptoms reported


Genitourinary: No symptoms reported


Female Genitourinary: No symptoms reported


Musculoskeletal: See HPI


Skin: No symptoms reported


Hematologic/Lymphatic: No symptoms reported


Neurological/Psychological: No symptoms reported





Physical Exam





- Vital signs


Vitals: 


 











Temp Pulse Resp BP Pulse Ox


 


 98.7 F   95   18   112/72   100 


 


 18 15:08  18 15:08  18 15:08  18 15:08  18 15:08











Interpretation: Normal





- General


General appearance: Alert


Notes: 





Pale





- HEENT


Head: Normocephalic, Atraumatic


Eyes: Normal


Conjunctiva: Normal


Pupils: PERRL


Mucous membranes: Dry


Pharynx: Erythema, Tonsillar hypertrophy - Exudative right tonsil


Neck: Lymphadenopathy - Bilateral anterior, Supple





- Respiratory


Respiratory status: No respiratory distress


Chest status: Nontender


Breath sounds: Normal


Chest palpation: Normal





- Cardiovascular


Rhythm: Regular


Heart sounds: Normal auscultation


Murmur: No





- Abdominal


Inspection: Normal


Distension: No distension


Bowel sounds: Normal


Tenderness: Nontender


Organomegaly: Mass - On this with a heart tones of 157 in PET





- Genitourinary


External exam: Normal


Notes: 





qtip in vagina, no blood in vagina.





- Back


Back: CVA tenderness - right





- Extremities


General upper extremity: Normal inspection, Nontender, Normal color, Normal ROM

, Normal temperature


General lower extremity: Normal inspection, Nontender, Normal color, Normal ROM

, Normal temperature, Normal weight bearing.  No: Steve's sign





- Neurological


Neuro grossly intact: Yes


Cognition: Normal


Orientation: AAOx4


Ricardo Coma Scale Eye Opening: Spontaneous


Ricardo Coma Scale Verbal: Oriented


[place] Coma Scale Motor: Obeys Commands


[place] Coma Scale Total: 15


Speech: Normal


Motor strength normal: LUE, RUE, LLE, RLE


Sensory: Normal





- Psychological


Associated symptoms: Normal affect, Normal mood





- Skin


Skin Temperature: Warm


Skin Moisture: Dry


Skin Color: Normal


Skin irregularity: negative: Rash





Course





- Re-evaluation


Re-evalutation: 





18 18:47


Rapid strep is positive, the urinalysis shows positive nitrite with 4 RBCs and 

35 WBCs trace of bacteria has been sent for culture.


18 18:58


Consult Dr. Sarthak Umaña and he will admit overnight to observation to the 

second floor because she has exudative tonsillitis and pyelonephritis.  I spoke 

to the radiologist dr mcgee and the ultrasound is normal the placenta is 

posterior and no sign of previa or abruption.


18 18:59








- Vital Signs


Vital signs: 


 











Temp Pulse Resp BP Pulse Ox


 


 97.6 F   92   18   125/75   100 


 


 18 17:33  18 17:33  18 15:08  18 17:33  18 17:33














- Laboratory


Result Diagrams: 


 18 16:24





 18 16:24


Laboratory results interpreted by me: 


 











  18





  16:05 16:24 16:24


 


WBC   14.6 H 


 


Hgb   11.6 L 


 


Hct   34.8 L 


 


RDW   18.9 H 


 


Seg Neutrophils %   87.5 H 


 


Lymphocytes %   7.3 L 


 


Absolute Neutrophils   12.8 H 


 


Carbon Dioxide    21 L


 


BUN    4 L


 


Creatinine    0.41 L


 


Urine Nitrite  POSITIVE H  


 


Urine Urobilinogen  2.0 H  


 


Ur Leukocyte Esterase  SMALL H  














Discharge





- Discharge


Clinical Impression: 


 Exudative right tonsillitis strep throat, Pyelonephritis





Condition: Stable


Disposition: ADMITTED AS OBSERVATION


Admitting Provider: [facility]


Unit Admitted: Post Partum

## 2018-03-11 NOTE — ER DOCUMENT REPORT
ED Medical Screen (RME)





- General


Chief Complaint: Fever


Stated Complaint: VAGINAL BLEEDING


Time Seen by Provider: 18 15:48


Mode of Arrival: Ambulatory


Information source: Patient - 26 yo female 16 weeks pregnant woke up with right 

sided sore throat yesterday, was in bed, fever all day. Today had some spotting 

this morning after urination, also in the bathtub. Right low back pain (below 

the CVA). Exudative right tonsil- no a abscess.


TRAVEL OUTSIDE OF THE [place] IN LAST 30 DAYS: No





- Related Data


Allergies/Adverse Reactions: 


 





aspirin [Aspirin] Allergy (Intermediate, Verified 18 14:49)


 Facial swelling


diphenhydramine HCl [From Benadryl] Allergy (Intermediate, Verified 18 14:

49)


 Facial swelling


Sulfa (Sulfonamide Antibiotics) Allergy (Verified 18 14:49)


 unknown











Past Medical History





- Past Medical History


Cardiac Medical History: Reports: Hx Hypertension - with pregnancy


Pulmonary Medical History: 


   Denies: Hx Tuberculosis


Renal/ Medical History: Reports: Hx Kidney Stones.  Denies: Hx Peritoneal 

Dialysis


GI Medical History: Reports: Hx Gastroesophageal Reflux Disease


Musculoskeltal Medical History: Reports Hx Musculoskeletal Trauma


Psychiatric Medical History: Reports: Hx Anxiety, Hx Bipolar Disorder


   Denies: Hx Depression


Past Surgical History: Reports: Hx  Section, Hx Gynecologic Surgery, Hx 

Kidney (Renal Surgery)





- Immunizations


Immunizations up to date: Yes


Hx Diphtheria, Pertussis, Tetanus Vaccination: Yes





Physical Exam





- Vital signs


Vitals: 





 











Temp Pulse Resp BP Pulse Ox


 


 98.7 F   95   18   112/72   100 


 


 18 15:08  18 15:08  18 15:08  18 15:08  18 15:08














Course





- Vital Signs


Vital signs: 





 











Temp Pulse Resp BP Pulse Ox


 


 98.7 F   95   18   112/72   100 


 


 18 15:08  18 15:08  18 15:08  18 15:08  18 15:08

## 2018-03-11 NOTE — RADIOLOGY REPORT (SQ)
EXAM DESCRIPTION:  U/S OB LIMITED



COMPLETED DATE/TIME:  3/11/2018 5:37 pm



REASON FOR STUDY:  vaginal spotting



COMPARISON:  None.



TECHNIQUE:  Limited transabdominal grayscale ultrasound for evaluation of specific requested obstetri
diane parameters.



LIMITATIONS:  None.



FINDINGS:  CERVICAL LENGTH: 4.7   Closed.

PARUL: Single deepest pocket is 3.2 cm.

FHR: 158 beats per minute.

PRESENTATION: Cephalic.

OTHER: No other significant findings.



IMPRESSION:  LIMITED OBSTETRICAL ULTRASOUND WITH MEASURED PARAMETERS DELINEATED ABOVE.

Trimester of pregnancy: Second trimester - 13 weeks 1 day to 27 weeks 6 days.



TECHNICAL DOCUMENTATION:  JOB ID:  [id number]

 2011 [employer]- All Rights Reserved



Reading location - IP/workstation name: [email or web address]

## 2018-03-12 NOTE — PDOC H&P
History of Present Illness


Admission Date/PCP: 


  18 19:21





  ROSSY ROBERTSON MD





Patient complains of: Pregnancy, sore throat, pyelonephritis.


History of Present Illness: 


MARIA TERESA PLATT is a 27 year old female


She reports illness and cva tenderness.





Past Medical History


LMP: 17


Cardiac Medical History: Reports: Hypertension - during pregnancy


   Denies: Congestive Heart Failure, Myocardial Infarction


Pulmonary Medical History: 


   Denies: Asthma, Bronchitis, Chronic Obstructive Pulmonary Disease (COPD), 

Pneumonia, Tuberculosis


Neurological Medical History: 


   Denies: Seizures


Renal/ Medical History: Reports: Other - Pyelonephritis during 1 of her other 

pregnancies


   Denies: End Stage Renal Disease


GI Medical History: 


   Denies: Cirrhosis, Gastroesophageal Reflux Disease


Musculoskeltal Medical History: 


   Denies: Arthritis


Psychiatric Medical History: Reports: Bipolar Disorder - no meds, Depression - 

no meds





Past Surgical History


Past Surgical History: Reports:  Section





Social History


Smoking Status: Current Every Day Smoker


Frequency of Alcohol Use: None


Hx Recreational Drug Use: No


Drugs: None


Hx Prescription Drug Abuse: No





Family History


Family History: Hypertension, Malignancy, Thyroid Disfunction, Other - kidney 

disease


Parental Family History Reviewed: Yes


Children Family History Reviewed: Yes


Sibling(s) Family History Reviewed.: Yes





Medication/Allergy


Home Medications: 








Buprenorphine HCl [Subutex 8 mg Sublingual Tablet] 2 tab SL DAILY 18 


Prenatal Vit/Iron Fum/Folic AC [Prenatal Tablet] 1 tab PO DAILY 18 








Allergies/Adverse Reactions: 


 





aspirin [Aspirin] Allergy (Intermediate, Verified 18 14:49)


 Facial swelling


diphenhydramine HCl [From Benadryl] Allergy (Intermediate, Verified 18 14:

49)


 Facial swelling


Sulfa (Sulfonamide Antibiotics) Allergy (Verified 18 14:49)


 unknown











Physical Exam





- Physical Exam


Vital Signs: 


 











Temp Pulse Resp BP Pulse Ox


 


 98.8 F   89   18   107/55 L  96 


 


 18 22:15  18 22:15  18 22:15  18 22:15  18 22:15








 Intake & Output











 03/10/18 03/11/18 03/12/18





 05:59 06:59 06:59


 


Weight   64.8 kg














Result


Impressions: 


 





Obstetrics Ultrasound  18 15:51


IMPRESSION:  LIMITED OBSTETRICAL ULTRASOUND WITH MEASURED PARAMETERS DELINEATED 

ABOVE.


Trimester of pregnancy: Second trimester - 13 weeks 1 day to 27 weeks 6 days.


 














Assessment & Plan





- Diagnosis


(1) Pyelonephritis affecting pregnancy in second trimester


Is this a current diagnosis for this admission?: Yes   


Plan: 


begin iv fluids and Rhocephin.








- Time


Time Spent: 30 to 50 Minutes


Critical Time spent with patient: Less than 15 minutes


Smoking Cessation Education: 3 to 10 minutes


Medications reviewed and adjusted accordingly: Yes


Anticipated discharge: Home


Within: within 48 hours





- Plan Summary


Plan Summary: 





Begin iv fluids and antibiotics

## 2018-03-12 NOTE — PHYSICIAN ADVISORY NOTE
Physician Advisor ProgressNote


.: 


Pursuant to the  [facility], I have reviewed the medical record 

for this patient.  





Physician Advisor Statement: 





Please consider documenting, if you agree:


1.  "Strep pharyngitis"


2.  Medical necessity:  if pt is not sufficiently improved for d/c 3/12, please 

document clinical reasons/concerns, & then may change to Inpatient status.








Status:  Appropriately Obs to start.  See above.





Thanks!


CK

## 2018-03-13 NOTE — PDOC PROGRESS REPORT
Subjective


Progress Note for:: 03/13/18


Subjective:: 





pt indicates still having quite a bit of pain but desires to discharge home to 

care for children.  


Reason For Visit: 


PYLO, STREP








Physical Exam





- Physical Exam


Vital Signs: 


 











Temp Pulse Resp BP Pulse Ox


 


 98.2 F   91   20   101/60   95 


 


 03/13/18 11:40  03/13/18 11:40  03/13/18 11:40  03/13/18 11:40  03/13/18 11:40








 Intake & Output











 03/12/18 03/13/18 03/14/18





 06:59 06:59 06:59


 


Intake Total  2200 


 


Balance  2200 











General appearance: PRESENT: mild distress, well-developed, well-nourished - +

CVA tenderness bilaterally.  +guarding


GI/Abdominal exam: PRESENT: soft - gravid abdomen below umbilicus


Neurological exam: PRESENT: alert, awake





Result


Laboratory Results: 


 





 03/12/18 08:06 








Impressions: 


 





Obstetrics Ultrasound  03/11/18 15:51


IMPRESSION:  LIMITED OBSTETRICAL ULTRASOUND WITH MEASURED PARAMETERS DELINEATED 

ABOVE.


Trimester of pregnancy: Second trimester - 13 weeks 1 day to 27 weeks 6 days.


 














Assessment & Plan





- Diagnosis


(1) Strep pharyngitis


Is this a current diagnosis for this admission?: Yes   





(2) Pregnancy


Qualifiers: 


   Weeks of gestation: 16 weeks   Qualified Code(s): Z3A.16 - 16 weeks 

gestation of pregnancy   


Is this a current diagnosis for this admission?: Yes   





(3) Pyelonephritis affecting pregnancy in second trimester


Is this a current diagnosis for this admission?: Yes   





- Inpatient Certification


Based on my medical assessment, after consideration of the patient's 

comorbidities, presenting symptoms, or acuity I expect that the services needed 

warrant INPATIENT care.: Yes


I certify that my determination is in accordance with my understanding of 

Medicare's requirements for reasonable and necessary INPATIENT services [42 CFR 

412.3e].: Yes


Medical Necessity: Need Close Monitoring Due to Risk of Patient Decompensation 

- increased risks for ARDS for pregnant patients with pyelonephritis.  48 hours 

of antibiotics and decreasing leukocytosis, Need For IV Fluids, Need for Pain 

Control, Need for IV Antibiotics





- Plan Summary


Plan Summary: 





plan to complete 48 hours of IV antibiotics for pyleonephritis and pharyngitis.

  Recheck CBC for WBC trend in AM.

## 2018-03-14 NOTE — PDOC DISCHARGE SUMMARY
Final Diagnosis


Discharge Date: 03/14/18





- Final Diagnosis


(1) Pregnancy


Is this a current diagnosis for this admission?: Yes   





(2) Pyelonephritis affecting pregnancy in second trimester


Is this a current diagnosis for this admission?: Yes   





(3) Strep pharyngitis


Is this a current diagnosis for this admission?: Yes   





Discharge Data





- Discharge Medication


Home Medications: 








Buprenorphine HCl [Subutex 8 mg Sublingual Tablet] 2 tab SL DAILY 03/12/18 


Prenatal Vit/Iron Fum/Folic AC [Prenatal Tablet] 1 tab PO DAILY 03/12/18 








Reason(s) for Admission: Other - 16 weeks pyelonephritis


Prenatal Procedures: None





- Diagnosis Test


Laboratory: 


 











Temp Pulse Resp BP Pulse Ox


 


 97.9 F   92   16   116/65   98 


 


 03/14/18 08:32  03/14/18 08:32  03/14/18 08:32  03/14/18 08:32  03/14/18 08:32








 











  03/12/18 03/14/18





  08:06 06:29


 


RBC  3.82  3.32 L


 


Hgb  10.3 L  9.2 L


 


Hct  31.7 L  27.4 L














- Discharge information/Instructions


Discharge Activity: Activity As Tolerated


Discharge Diet: As Tolerated


Disposition: HOME, SELF-CARE


Follow up with: [facility]


in: 1 - f/u HD

## 2018-03-14 NOTE — PDOC PROGRESS REPORT
Subjective


Progress Note for:: 03/14/18


Reason For Visit: 


PYLO, STREP








Physical Exam





- Physical Exam


Vital Signs: 


 











Temp Pulse Resp BP Pulse Ox


 


 97.9 F   92   16   116/65   98 


 


 03/14/18 08:32  03/14/18 08:32  03/14/18 08:32  03/14/18 08:32  03/14/18 08:32








 Intake & Output











 03/13/18 03/14/18 03/15/18





 06:59 06:59 06:59


 


Intake Total 2200  


 


Balance 2200  











General appearance: PRESENT: no acute distress, well-developed, well-nourished


Head exam: PRESENT: atraumatic, normocephalic


Eye exam: PRESENT: conjunctiva pink, EOMI, PERRLA.  ABSENT: scleral icterus


Ear exam: PRESENT: normal external ear exam


Mouth exam: PRESENT: moist, tongue midline


Neck exam: PRESENT: full ROM.  ABSENT: carotid bruit, JVD, lymphadenopathy, 

thyromegaly


Respiratory exam: PRESENT: clear to auscultation cole


Cardiovascular exam: PRESENT: RRR.  ABSENT: diastolic murmur, rubs, systolic 

murmur


Pulses: PRESENT: normal dorsalis pedis pul, +2 pedal pulses bilateral


Vascular exam: PRESENT: normal capillary refill


GI/Abdominal exam: PRESENT: normal bowel sounds, soft.  ABSENT: distended, 

guarding, mass, organolmegaly, rebound, tenderness


Rectal exam: PRESENT: deferred


Extremities exam: PRESENT: full ROM.  ABSENT: calf tenderness, clubbing, pedal 

edema


Musculoskeletal exam: PRESENT: ambulatory


Neurological exam: PRESENT: alert, awake, oriented to person, oriented to place

, oriented to time, oriented to situation, CN II-XII grossly intact.  ABSENT: 

motor sensory deficit


Psychiatric exam: PRESENT: appropriate affect, normal mood.  ABSENT: homicidal 

ideation, suicidal ideation


Skin exam: PRESENT: dry, intact, warm.  ABSENT: cyanosis, rash





Result


Laboratory Results: 


 





 03/14/18 06:29 





 











  03/14/18





  06:29


 


WBC  6.3


 


RBC  3.32 L


 


Hgb  9.2 L


 


Hct  27.4 L


 


MCV  82


 


MCH  27.6


 


MCHC  33.4


 


RDW  19.1 H


 


Plt Count  225


 


Seg Neutrophils %  57.0


 


Lymphocytes %  31.8


 


Monocytes %  8.2


 


Eosinophils %  2.6


 


Basophils %  0.4


 


Absolute Neutrophils  3.6


 


Absolute Lymphocytes  2.0


 


Absolute Monocytes  0.5


 


Absolute Eosinophils  0.2


 


Absolute Basophils  0.0











Impressions: 


 





Obstetrics Ultrasound  03/11/18 15:51


IMPRESSION:  LIMITED OBSTETRICAL ULTRASOUND WITH MEASURED PARAMETERS DELINEATED 

ABOVE.


Trimester of pregnancy: Second trimester - 13 weeks 1 day to 27 weeks 6 days.


 














Assessment & Plan





- Diagnosis


(1) Pregnancy


Qualifiers: 


   Weeks of gestation: 16 weeks   Qualified Code(s): Z3A.16 - 16 weeks 

gestation of pregnancy   


Is this a current diagnosis for this admission?: Yes   





(2) Pyelonephritis affecting pregnancy in second trimester


Is this a current diagnosis for this admission?: Yes   





(3) Strep pharyngitis


Is this a current diagnosis for this admission?: Yes   





- Plan Summary


Plan Summary: 





d/c f/u HD

## 2018-06-04 NOTE — RADIOLOGY REPORT (SQ)
EXAM DESCRIPTION:  U/S OB LIMITED



COMPLETED DATE/TIME:  6/4/2018 7:41 pm



REASON FOR STUDY:  s/p fall, vaginal bleeding with lac, eval placent



COMPARISON:  3/11/2018



TECHNIQUE:  Limited transabdominal grayscale ultrasound for evaluation of specific requested obstetri
diane parameters.



LIMITATIONS:  None.



FINDINGS:  CERVICAL LENGTH: 3.5 cm.   Closed.

FHR: 157 beats per minute.

PRESENTATION: Cephalic.

OTHER: Anterior placenta without abruption.



IMPRESSION:  LIMITED OBSTETRICAL ULTRASOUND WITH MEASURED PARAMETERS DELINEATED ABOVE.  NO ABRUPTION 
IDENTIFIED.

Trimester of pregnancy: Third trimester - 28 weeks to delivery.



TECHNICAL DOCUMENTATION:  JOB ID:  [id number]

 2011 [employer]- All Rights Reserved



Reading location - IP/workstation name: [email or web address]

## 2018-06-21 NOTE — ER DOCUMENT REPORT
ED Oral Problem





- General


Chief Complaint: Toothache


Stated Complaint: TOOTH PAIN


Time Seen by Provider: 18 17:11


Mode of Arrival: Ambulatory


Information source: Patient


Notes: 





27-year-old female presents to ED for dental pain.  She is 31 weeks pregnant 

and states that she cannot get her teeth fixed because she cannot be put sleep 

to have her teeth fixed.  She states that these have been bad for a long time 

and she is just not available get him fixed.  She has multiple teeth on the 

upper jaw 9 through 13+ some on the bottom that are all decayed.


TRAVEL OUTSIDE OF THE [place] IN LAST 30 DAYS: No





- HPI


Patient complains to provider of: Toothache


Onset: Other


Onset: Gradual - Panic


Quality of pain: Achy, Throbbing


Severity: Moderate


Pain Level: 3





- Related Data


Allergies/Adverse Reactions: 


 





aspirin [Aspirin] Allergy (Intermediate, Verified 18 18:18)


 Facial swelling


diphenhydramine HCl [From Benadryl] Allergy (Intermediate, Verified 18 18:

18)


 Facial swelling


Sulfa (Sulfonamide Antibiotics) Allergy (Verified 18 18:18)


 unknown











Past Medical History





- Social History


Smoking Status: Current Every Day Smoker


Chew tobacco use (# tins/day): No


Frequency of alcohol use: None


Drug Abuse: None


Family History: Hypertension, Malignancy, Thyroid Disfunction, Other - kidney 

disease


Patient has suicidal ideation: No


Patient has homicidal ideation: No





- Past Medical History


Cardiac Medical History: Reports: Hx Hypertension - during pregnancy


   Denies: Hx Congestive Heart Failure, Hx Heart Attack


Pulmonary Medical History: 


   Denies: Hx Asthma, Hx Bronchitis, Hx COPD, Hx Pneumonia, Hx Tuberculosis


Neurological Medical History: Denies: Hx Seizures


Renal/ Medical History: Denies: Hx End Stage Renal Disease, Hx Kidney Stones, 

Hx Peritoneal Dialysis


GI Medical History: Denies: Hx Cirrhosis, Hx Gastroesophageal Reflux Disease, 

Hx Ulcer


Musculoskeltal Medical History: Denies Hx Arthritis, Denies Hx Multiple 

Sclerosis, Reports Hx Musculoskeletal Trauma


Psychiatric Medical History: Reports: Hx Anxiety, Hx Bipolar Disorder - no meds

, Hx Depression - no meds


   Denies: Hx Schizophrenia


Past Surgical History: Reports: Hx  Section, Hx Gynecologic Surgery, Hx 

Kidney (Renal Surgery)





- Immunizations


Immunizations up to date: Yes


Hx Diphtheria, Pertussis, Tetanus Vaccination: Yes


Hx Pneumococcal Vaccination: 10/01/14





Review of Systems





- Review of Systems


Constitutional: No symptoms reported


EENT: Mouth pain, Dental problem


Cardiovascular: No symptoms reported


Respiratory: No symptoms reported


Gastrointestinal: No symptoms reported


Genitourinary: No symptoms reported


Female Genitourinary: No symptoms reported


Musculoskeletal: No symptoms reported


Skin: No symptoms reported


Hematologic/Lymphatic: No symptoms reported


Neurological/Psychological: No symptoms reported





Physical Exam





- Vital signs


Vitals: 


 











Temp Pulse Resp BP Pulse Ox


 


 98.5 F   80   16   122/60   97 


 


 18 16:33  18 16:33  18 16:33  18 16:33  18 16:33











Interpretation: Normal





- General


General appearance: Appears well, Alert





- HEENT


Head: Normocephalic, Atraumatic


Eyes: Normal


Pupils: PERRL


Ears: Normal


External canal: Normal


Tympanic membrane: Normal


Sinus: Normal


Nasal: Normal


Mouth/Lips: Caries


Teeth diagram: 


  __________________________














  __________________________





 1 - Multiple dental cavities in this area plus throughout the rest of the 

mouth.  Worst pain is in this area of the mouth.  Patient is 31 weeks pregnant.





Pharynx: Normal


Neck: Anterior cervical chain





- Respiratory


Respiratory status: No respiratory distress


Chest status: Nontender


Breath sounds: Normal


Chest palpation: Normal





- Cardiovascular


Rhythm: Regular


Heart sounds: Normal auscultation


Murmur: No





- Abdominal


Inspection: Gravid female - 31 weeks pregnant, has appointment at OB/GYN 

tomorrow, denies any abdominal pain discomfort, states baby is moving as usual.


Distension: No distension


Bowel sounds: Normal


Tenderness: Nontender


Organomegaly: No organomegaly





- Back


Back: Normal, Nontender





- Extremities


General upper extremity: Normal inspection, Nontender, Normal color, Normal ROM

, Normal temperature


General lower extremity: Normal inspection, Nontender, Normal color, Normal ROM

, Normal temperature, Normal weight bearing.  No: Steve's sign





- Neurological


Neuro grossly intact: Yes


Cognition: Normal


Orientation: AAOx4


[place] Coma Scale Eye Opening: Spontaneous


[place] Coma Scale Verbal: Oriented


Ricardo Coma Scale Motor: Obeys Commands


Ricardo Coma Scale Total: 15


Speech: Normal


Motor strength normal: LUE, RUE, LLE, RLE


Sensory: Normal





- Psychological


Associated symptoms: Normal affect, Normal mood





- Skin


Skin Temperature: Warm


Skin Moisture: Dry


Skin Color: Normal





Course





- Re-evaluation


Re-evalutation: 





18 19:57


Dr. Vidal was consulted patient was treated with Penicillin VK and viscous 

lidocaine.  He states that patient should come to see him tomorrow.  He states 

there is no reason that she cannot have dental care and including pulling teeth 

if needed.  Presentation is most consistent with likely an infected tooth.  

Airway is patent.  Vitals within normal limits.  Patient is able swallow 

without any difficulty.  There is no significant facial swelling.  No evidence 

of [place] angina, apical abscess, or airway obstruction.  Patient will be 

started on antibiotics.  I've instructed to follow-up with dentistry as 

earliest ability for definitive management.  At this time will discharge with 

return precautions and follow-up recommendations.  Verbal discharge 

instructions given a the bedside and opportunity for questions given. 

Medication warnings reviewed. Patient is in agreement with this plan and has 

verbalized understanding of return precautions and the need for primary care 

follow-up in the next 24-72 hours.











- Vital Signs


Vital signs: 


 











Temp Pulse Resp BP Pulse Ox


 


 98.5 F   80   16   122/60   97 


 


 18 16:33  18 16:33  18 16:33  18 16:33  18 16:33














Discharge





- Discharge


Clinical Impression: 


 Pain due to dental caries





Condition: Stable


Disposition: HOME, SELF-CARE


Additional Instructions: 


TOOTHACHE:





     Your pain is due to dental decay.  The tooth must be repaired in order for 

you to feel better.  You will, therefore, be referred to a dentist.  We do not 

have dentists on the staff at [facility].


     Severe swelling or drainage around a tooth usually means a dental abscess.

  This also requires evaluation and treatment by the dentist, but antibiotics 

may be prescribed while awaiting dental treatment.


     You should be rechecked immediately if you develop major swelling of the 

face, increasing pain, a lump in the jaw or gums, headache, difficulty 

swallowing, or fever.





PENICILLIN V K:


     You have been given a prescription for Penicillin VK.  Your physician has 

determined that this is the best antibiotic for your condition.


     Pen VK can be taken with meals, however more of the antibiotic gets into 

the bloodstream if it's taken on an empty stomach.


     Penicillin usually has no side effects.  However, allergy to penicillins 

is common.  If you have had an allergic reaction to any drug of the penicillin 

family, you should never take any other penicillin.  Notify your doctor at once 

if you develop hives, itching, swelling, faintness, or shortness of breath.





Acetaminophen





     Acetaminophen may be taken for pain relief or fever control. It's much 

safer than aspirin, offering a wider range of "safe" dosages.  It is safe 

during pregnancy.  Some brand names are Tylenol, Panadol, Datril, Anacin 3, 

Tempra, and Liquiprin. Acetaminophen can be repeated every four hours.  The 

following are maximum recommended dosages:





WEIGHT         Dose             Drops                  Elixir        Chewable(

80mg)


(LBS.)                            drprs=droppers    tsp=teaspoon


6                 40 mg            .4 ml (1/2)


6-11            80 mg            .8 ml (full)            1/2   tsp           1 

      tab


12-16         120 mg           1 1/2 drprs            3/4   tsp           1 1/2

  tabs


17-23         160 mg             2  drprs              1      tsp           2  

     tabs


24-30         240 mg             3  drprs              1 1/2 tsp           3   

    tabs


30-35         320 mg                                     2       tsp           

4       tabs


36-41         360 mg                                     2 1/4 tsp           4 1

/2  tabs


42-47         400 mg                                     2 1/2 tsp           5 

      tabs


48-53         480 mg                                     3       tsp          6

       tabs


54-59         520 mg                                     3  1/4 tsp          6 1

/2 tabs


60-64         560 mg                                     3  1/2 tsp          7 

     tabs 


65-70         600 mg                                     3  3/4 tsp          7 1

/2 tabs


71-76         640 mg                                     4       tsp           

8      tabs


77-82         720 mg                                     4 1/2  tsp           9

      tabs


83-88         800 mg                                     5       tsp           

10     tabs





>89 pounds or adults          650 mg to 900 mg 





Acetaminophen can be repeated every four hours. Maximum daily dose not to 

exceed 4000 mg.





   These maximum recommended dosages are slightly higher than the dosages 

written on the product container, but these dosages are very safe and well 

below the toxic dosage for acetaminophen.








You have been given a syringe of viscous lidocaine.  Please use a small amount 

of this viscous lidocaine to the area of your teeth and gums that is painful 

every 1-2 hours.  This is actual lidocaine and will numb the gum the lip and 

the tongue so be aware that you do not to your lips or gums.  Please follow-up 

with your OB/GYN tomorrow as scheduled and call a dentist to follow-up with the 

dentist as soon as possible to treat these teeth.








FOLLOW-UP CARE:


     You have been referred for follow-up care to the dentists listed below.  

Call the dentists office for an appointment as you were instructed or within 

the next two days.  If you experience worsening or a significant change in your 

symptoms, notify the physician immediately or return to the Emergency 

Department at any time for re-evaluation.





[facility]


[address] [place]


[phone]





[facility]


[address] [place] [address] [phone]





[facility]


[address]


[phone]





[facility]


[address] [facility]


[phone]





[facility]


[address] [facility]


[phone]


[email or web address]





[facility]


[address] [place]  [address] [phone]


Monday-  8:00am to 5:00 pm


Will see patients from other [facility].


Charges based on income and family size and


accepts Medicare, Medicaid, and Insurances


Will pull molars





[facility]


Student Clinics


[facility] [address] [phone]


Hours of Operation


   8:00 am - 4:30 pm weekdays





The following dental offices accept Medicaid:





   [facility]   [phone]


   Dr. Langford         [phone]


   Dr. Flowers         [phone]


   Dr. Sprague         [phone]


   Dr. Arrington         [phone]


   [facility] [place] [place]


      oral surgery      [phone]


   Dr. Soto ([place])      [phone]


   Dr. Madrid ([place])   [phone]


   [facility]      [phone]


   [facility] Mont[facility]ayor ([place])   [phone]


   Dr. Saleem ([place])      [phone]


   [facility]      [phone]


   [facility]   [phone]


   [facility]   [phone]


   Dr. Feliciano ([place])      [phone]


   [facility] and 


      ([place])      [phone]





   Medicaid Care Line      [phone]


Prescriptions: 


Penicillin V Potassium [Penicillin Vk 500 mg Tablet] 500 mg PO BID #20 tablet


Forms:  Smoking Cessation Education


Referrals: 


NALINI FONSECA MD [Primary Care Provider] - Follow up tomorrow

## 2018-06-23 NOTE — ER DOCUMENT REPORT
ED Oral Problem





- General


Chief Complaint: Mouth Problem


Stated Complaint: GUM PAIN


Time Seen by Provider: 18 13:26


Mode of Arrival: Ambulatory


Information source: Patient


Notes: 





27-year-old female presents to ED for complaint of continued gum pain.  She was 

seen here on Thursday for the same pain.  She has multiple decayed teeth that 

are many of them broken off on the right upper jaw.  She states the ibuprofen 

and Tylenol are not helping with her pain and she is used to viscous lidocaine 

and she is still having too much pain to even sleep.  She is 31 weeks pregnant.

  She did go to the OB/GYN and they told her to call the dentist and have the 

dentist call the OB/GYN if they did not want to take care of her teeth while 

she was pregnant.  Patient states she knows that she is pregnant and she knows 

no one likes to give her Percocet but she is already had Percocet this 

pregnancy please give her enough Percocet to sleep.


TRAVEL OUTSIDE OF THE [place] IN LAST 30 DAYS: No





- HPI


Patient complains to provider of: Toothache


Onset: Other - Chronic


Onset: Gradual


Quality of pain: Sharp, Throbbing


Severity: Severe


Pain Level: 5


Associated symptoms: Toothache


Worsened by: Cold


Relieved by: Nothing


Similar symptoms previously: Yes


Recently seen / treated by doctor/dentist: Yes





- Related Data


Allergies/Adverse Reactions: 


 





aspirin [Aspirin] Allergy (Intermediate, Verified 18 12:56)


 Facial swelling


diphenhydramine HCl [From Benadryl] Allergy (Intermediate, Verified 18 12:

56)


 Facial swelling


Sulfa (Sulfonamide Antibiotics) Allergy (Verified 18 12:56)


 unknown











Past Medical History





- General


Information source: Patient





- Social History


Smoking Status: Current Every Day Smoker


Cigarette use (# per day): Yes


Chew tobacco use (# tins/day): No


Smoking Education Provided: Yes - 4 minutes


Frequency of alcohol use: None


Drug Abuse: None


Lives with: Family


Family History: Hypertension, Malignancy, Thyroid Disfunction, Other - kidney 

disease





- Past Medical History


Cardiac Medical History: Reports: Hx Hypertension - during pregnancy


Pulmonary Medical History: Reports: None


EENT Medical History: Reports: None


Neurological Medical History: Reports: None


Endocrine Medical History: Reports: None


Renal/ Medical History: Reports: None


Malignancy Medical History: Reports: None


GI Medical History: Reports: None


Musculoskeltal Medical History: Reports Hx Musculoskeletal Trauma


Skin Medical History: Reports None


Psychiatric Medical History: Reports: Hx Anxiety, Hx Bipolar Disorder - no meds

, Hx Depression - no meds


Traumatic Medical History: Reports: None


Infectious Medical History: Reports: None


Past Surgical History: Reports: Hx  Section, Hx Gynecologic Surgery, Hx 

Kidney (Renal Surgery)





- Immunizations


Immunizations up to date: Yes


Hx Diphtheria, Pertussis, Tetanus Vaccination: Yes


Hx Pneumococcal Vaccination: 10/01/14





Review of Systems





- Review of Systems


Constitutional: No symptoms reported


EENT: Mouth pain, Dental problem


Cardiovascular: No symptoms reported


Respiratory: No symptoms reported


Gastrointestinal: No symptoms reported


Genitourinary: No symptoms reported


Female Genitourinary: No symptoms reported


Musculoskeletal: No symptoms reported


Skin: No symptoms reported


Hematologic/Lymphatic: No symptoms reported


Neurological/Psychological: No symptoms reported


-: Yes All other systems reviewed and negative





Physical Exam





- Vital signs


Vitals: 


 











Temp Pulse Resp BP Pulse Ox


 


 98.5 F   86   20   126/89 H  99 


 


 18 13:03  18 13:03  18 13:03  18 13:03  18 13:03











Interpretation: Normal





- General


General appearance: Appears well, Alert





- HEENT


Head: Normocephalic, Atraumatic


Eyes: Normal


Pupils: PERRL


Ears: Normal


External canal: Normal


Tympanic membrane: Normal


Sinus: Normal


Nasal: Normal


Mouth/Lips: Caries - Multiple caries with gingivitis multiple decayed teeth 

that are broken off


Pharynx: Normal


Neck: Anterior cervical chain





- Respiratory


Respiratory status: No respiratory distress


Chest status: Nontender


Breath sounds: Normal


Chest palpation: Normal





- Cardiovascular


Rhythm: Regular


Heart sounds: Normal auscultation


Murmur: No





- Abdominal


Inspection: Gravid female - In 1 weeks pregnant


Distension: No distension


Bowel sounds: Normal


Tenderness: Nontender


Organomegaly: No organomegaly





- Back


Back: Normal, Nontender





- Extremities


General upper extremity: Normal inspection, Nontender, Normal color, Normal ROM

, Normal temperature


General lower extremity: Normal inspection, Nontender, Normal color, Normal ROM

, Normal temperature, Normal weight bearing.  No: Steve's sign





- Neurological


Neuro grossly intact: Yes


Cognition: Normal


Orientation: AAOx4


[place] Coma Scale Eye Opening: Spontaneous


[place]ardo Coma Scale Verbal: Oriented


[place] Coma Scale Motor: Obeys Commands


[place]ardo Coma Scale Total: 15


Speech: Normal


Motor strength normal: LUE, RUE, LLE, RLE


Sensory: Normal





- Psychological


Associated symptoms: Normal affect, Normal mood





- Skin


Skin Temperature: Warm


Skin Moisture: Dry


Skin Color: Normal





Course





- Re-evaluation


Re-evalutation: 





18 16:11


Discussed with patient risks and benefits of any narcotic when she is pregnant.

  Explained the patient the concerns of the feet is getting the Percocet that 

she is getting.  She states that is not doing the fetus any good if she cannot 

sleep and cannot deal with life at this pain.  Patient was written a 

prescription for 2 Percocet 1 to take tonight to sleep in 1 to take tomorrow.  

She was instructed to call the dentist Monday morning and get a an appointment 

to have her teeth care for as soon as possible.  I explained to the patient it 

is not healthy for her or the baby to be taken narcotics when she is 31 weeks 

pregnant.  Patient did go to the OB/GYN since I saw her on Thursday and the OB/

GYN told her she could have sedation to have her teeth cared for.





- Vital Signs


Vital signs: 


 











Temp Pulse Resp BP Pulse Ox


 


 98.5 F   86   20   126/89 H  99 


 


 18 13:03  18 13:03  18 13:03  18 13:03  18 13:03














Discharge





- Discharge


Clinical Impression: 


 Pain due to dental caries, 31 weeks gestation of pregnancy





Condition: Stable


Disposition: HOME, SELF-CARE


Additional Instructions: 


TOOTHACHE:





     Your pain is due to dental decay.  The tooth must be repaired in order for 

you to feel better.  You will, therefore, be referred to a dentist.  We do not 

have dentists on the staff at [facility].


     Severe swelling or drainage around a tooth usually means a dental abscess.

  This also requires evaluation and treatment by the dentist, but antibiotics 

may be prescribed while awaiting dental treatment.


     You should be rechecked immediately if you develop major swelling of the 

face, increasing pain, a lump in the jaw or gums, headache, difficulty 

swallowing, or fever.








ORAL NARCOTIC MEDICATION:


     You have been given a prescription for 2 percocet to take at night for 

pain control.  This medication is a narcotic.  It's best taken with food, as 

nausea can result if taken on an empty stomach.


     Don't operate machinery or drive within six hours of taking this 

medication.  Do not combine this medicine with alcohol, or with any medication 

which can cause sedation (such as cold tablets or sleeping pills) unless you 

get permission from the physician.


     Narcotics tend to cause constipation.  If possible, drink plenty of fluids 

and eat a diet high in fiber and fruits.





     Please be aware that prescription narcotics also have the potential for 

abuse.  People become addicted to these medications because of the general 

sense of wellbeing that they induce.  This feeling along with a significant 

reduction in tension, anxiety, and aggression provides a stimulating seductive 

quality to these drugs.  Once your pain is under control, we encourage you to 

discard your unused narcotics.








CLINDAMYCIN:


     You have been given a prescription for the antibiotic clindamycin.  It is 

often prescribed for infections in the mouth, such as dental infections or 

abscesses, and for skin infections due to MRSA.  It's important that you take 

all the medication, unless instructed otherwise by your physician.  Failure to 

complete the entire course can result in relapse of your condition.


     Common side effects of antibiotics include nausea, intestinal cramping, or 

diarrhea.  Women may develop vaginal yeast infections, and babies can get yeast 

(thrush) in the mouth following the use of antibiotics.  Contact your physician 

if you develop significant side effects from this medication.


     Allergy to this antibiotic can result in hives, wheezing, faintness, or 

itching.  If symptoms of allergy occur, stop the medication and call the doctor.








FOLLOW-UP CARE:


     You have been referred for follow-up care to the dentists listed below.  

Call the dentists office for an appointment as you were instructed or within 

the next two days.  If you experience worsening or a significant change in your 

symptoms, notify the physician immediately or return to the Emergency 

Department at any time for re-evaluation.





[facility]


[address] [place], [place]


[phone]








[facility]


[address] [place] [address] [phone]





[facility]


[address] [facility]


[phone]





[facility]


[address]


[facility]


[phone]





[facility]


[address] [facility]


[phone]


[email or web address]





[facility]


[address]


[place]  [address] [phone]


Monday-  8:00am to 5:00 pm


Will see patients from other [facility].


Charges based on income and family size and


accepts Medicare, Medicaid, and Insurances


Will pull molars





[facility]


[facility] [address] [phone]


Hours of Operation


   8:00 am - 4:30 pm weekdays





The following dental offices accept Medicaid:





   [facility]   [phone]


   Dr. Langford         [phone]


   Dr. Flowers         [phone]


   Dr. Sprague         [phone]


   Dr. Arrington         [phone]


   [facility] [address] [place] [place]


      oral surgery      [phone]


   Dr. Soto ([place])      [phone]


   Dr. Madrid ([place])   [phone]


   [facility]      [phone]


   [facility] ([place])   [phone]


   Dr. Saleem ([place])      [phone]


   [facility]      [phone]


   [facility]   [phone]


   [facility]   [phone]


   Dr. Feliciano ([place])      [phone]


   [facility] 


      ([place])      [phone]





   Medicaid Care Line      [phone]


Prescriptions: 


Clindamycin HCl [Cleocin 300 mg Capsule] 300 mg PO Q6 #28 capsule


Oxycodone HCl/Acetaminophen [Percocet 5-325 mg Tablet] 1 tab PO HSP PRN #2 tab


 PRN Reason: 


Forms:  Elevated Blood Pressure, Smoking Cessation Education


Referrals: 


NALINI FONSECA MD [Primary Care Provider] - Follow up as needed

## 2018-07-20 NOTE — NON STRESS TEST REPORT
=================================================================

Non Stress Test

=================================================================

Datetime Report Generated by CPN: 07/20/2018 17:48

   

   

=================================================================

DEMOGRAPHIC

=================================================================

   

EGA NST:  34.4

   

=================================================================

INDICATION

=================================================================

   

Indication for Study:  Ordered by Provider; Other

Indication for Study (NST) Other:  Repeat NST

   

=================================================================

MONITORING

=================================================================

   

Monitor Explained:  Monitor Explained; Test Explained; Patient

   Verbalized Understanding

Time on Monitor:  07/20/2018 16:25

Time off Monitor:  07/20/2018 17:13

NST Duration:  48

   

=================================================================

NST INTERVENTIONS

=================================================================

   

NST Interventions:  PO Hydration; Reposition Patient

Physician Notified NST:  Dr Efrain

BABY A:  [id number]

   

=================================================================

BABY A

=================================================================

   

Fetal Movement :  Present

Contraction Frequency :  irritability

FHR Baseline :  130

Accelerations :  15X15

Decelerations :  None

Variability :  Moderate 6-25bpm

NST Review:  Meets Criteria for Reactive NST

NST Review and Verified By :  DEJA Daileyavasean RN

NST Results:  Reactive

   

=================================================================

NST REPORT

=================================================================

   

Report Trigger:  Send Report

## 2019-07-27 NOTE — ER DOCUMENT REPORT
Entered by TUTU IBARRA SCRIBE  [id number] 





Acting as scribe for:YOLIE MAIK DO





ED ENT





- General


Chief Complaint: Fever


Stated Complaint: FEVER, PUSS COMING FROM THROAT


Time Seen by Provider: 19 04:00


Primary Care Provider: 


BREANNA PAUL MD [Primary Care Provider] - Follow up as needed


Mode of Arrival: Ambulatory


Information source: Patient


Notes: 





Patient is a 28-year-old female who presents to the emergency department today 

with complaints of a 2-day history of fevers with associated sore throat and 

nasal congestion both of which began today.  Patient states her temperatures has

has gotten up to 102 F.  Patient states that she was eating Ramen noodles today

and she felt she could not swallow them stating they "got stuck".  Patient 

states she has been taking Motrin and TheraFlu at home.  Patient complains of a 

slight headache now.  Patient denies any ear pain, cough, nausea, vomiting, 

diarrhea, or dysuria.


TRAVEL OUTSIDE OF THE [place] IN LAST 30 DAYS: No





- Related Data


Allergies/Adverse Reactions: 


                                        





aspirin [Aspirin] Allergy (Intermediate, Verified 18 16:30)


   Facial swelling


diphenhydramine HCl [From Benadryl] Allergy (Intermediate, Verified 18 

16:30)


   Facial swelling


Sulfa (Sulfonamide Antibiotics) Allergy (Verified 18 16:30)


   unknown











Past Medical History





- General


Information source: Patient





- Social History


Smoking Status: Current Every Day Smoker


Cigarette use (# per day): Yes


Chew tobacco use (# tins/day): No


Frequency of alcohol use: None


Drug Abuse: None


Lives with: Family


Family History: Reviewed & Not Pertinent, Hypertension, Malignancy, Thyroid 

Disfunction, Other - kidney disease





- Past Medical History


Cardiac Medical History: Reports: Hx Hypertension - during pregnancy


Musculoskeletal Medical History: Reports Hx Musculoskeletal Trauma


Psychiatric Medical History: Reports: Hx Anxiety, Hx Bipolar Disorder - no meds,

Hx Depression - no meds


   Denies: Hx Schizophrenia


Past Surgical History: Reports: Hx  Section, Hx Gynecologic Surgery, Hx 

Kidney (Renal Surgery)





- Immunizations


Immunizations up to date: Yes


Hx Diphtheria, Pertussis, Tetanus Vaccination: Yes


Hx Pneumococcal Vaccination: 10/01/14





Review of Systems





- Review of Systems


Constitutional: See HPI, Fever


EENT: See HPI, Nose congestion, Throat pain, Difficulty swallowing.  denies: Ear

pain


Cardiovascular: No symptoms reported


Respiratory: denies: Cough


Gastrointestinal: denies: Diarrhea, Nausea, Vomiting


Genitourinary: denies: Dysuria


Female Genitourinary: No symptoms reported


Musculoskeletal: No symptoms reported


Skin: No symptoms reported


Hematologic/Lymphatic: No symptoms reported


Neurological/Psychological: See HPI, Headaches


-: Yes All other systems reviewed and negative





Physical Exam





- Vital signs


Vitals: 


                                        











Temp Pulse Resp BP Pulse Ox


 


 98.2 F   94   14   139/77 H  97 


 


 19 23:27  19 23:27  19 23:27  19 23:27  19 23:27














- Notes


Notes: 





PHYSICAL EXAM


 


GENERAL: Alert, interacts well.  Slightly tearful, appears uncomfortable when 

discussing her illness.


HEAD: Normocephalic, atraumatic.


EYES: Pupils equal, round, and reactive to light. Extraocular movements intact.


ENT: Oral mucosa moist, tongue midline. Nares patent, no nasal septal hematoma, 

TM's intact.  Clear rhinorrhea bilaterally, right greater than left, turbinate 

edema bilaterally.  No posterior oropharynx exudate, tonsillar hypertrophy 

bilaterally without exudate.  Handling her secretions well, no drooling, no 

difficulty swallowing.


NECK: Full range of motion. Supple. Trachea midline.


LUNGS: Clear to auscultation bilaterally, no wheezes, rales, or rhonchi. No 

respiratory distress.


HEART: Regular rate and rhythm. No murmurs, gallops, or rubs.


ABDOMEN: Soft, non-tender. Non-distended. Bowel sounds present in all 4 

quadrants. No guarding, rigidity, or rebound.


EXTREMITIES: Moves all 4 extremities spontaneously. No edema, radial and 

dorsalis pedis pulses 2/4 bilaterally. No cyanosis.


NEUROLOGICAL: Alert and oriented x3. Normal speech.


PSYCH: Normal affect, normal mood.


SKIN: Warm, dry, normal turgor. No rashes or lesions noted.








Course





- Re-evaluation


Re-evalutation: 





19 04:22


Strep swab negative, tonsils symmetrically enlarged, consistent with viral 

process, treat with steroids for tonsillar enlargement to provide symptomatic 

relief, Toradol for pain, takes ibuprofen without difficulty despite aspirin 

allergy.  Discharged home.





- Vital Signs


Vital signs: 


                                        











Temp Pulse Resp BP Pulse Ox


 


 98.2 F   94   14   139/77 H  97 


 


 19 23:27  19 23:27  19 23:27  19 23:27  19 23:27














Discharge





- Discharge


Clinical Impression: 


 Viral upper respiratory illness, Acute viral tonsillitis





Condition: Stable


Disposition: HOME, SELF-CARE


Additional Instructions: 


Upper Respiratory Illness





     You have a viral infection of the respiratory passages -- a "cold."  This 

common infection causes nasal congestion, drainage, and often sore throat and 

cough.  It is caused by a virus and is highly contagious.  The disease usually 

lasts a week or more, though the worst symptoms are usually over in 3 or 4 days.


     There is no "cure" for the viral infection -- it must run its course.  If 

there is a complication, such as bacterial infection in the nose, sinuses, 

middle ear, or bronchial tubes, antibiotics may be required, but antibiotics 

won't affect the virus.


     If you smoke, you should STOP!!  Drink plenty of fluids.  A humidifier may 

help.  An expectorant medication or decongestant may make you more comfortable. 

Use acetaminophen or ibuprofen for fever or aches.


     See the doctor if fever persists over two or three days, if there is any 

significant worsening of your symptoms, or if you simply fail to improve as 

expected.





Please use nasal saline rinses such as a NetiPot or NeilMed Sinus Rinses.  





Please use a nasal steroid such as Nasonex 1 squirt per nostril twice a day to 

decrease any swelling in your nose and congestion.





You may use over-the-counter throat lozenges to decrease your sore throat.  You 

may also gargle saline.





Please use ibuprofen (Motrin or Advil) 600-800 mg every 8 hours as needed for 

pain or fever.  You may also use acetaminophen (Tylenol) 1000 mg every 4-6 hours

as needed for pain or fever.  Please be aware that many medications contain 

acetaminophen, do not exceed a total of 1000 mg of acetaminophen every 6 hours. 







Prescriptions: 


Mometasone Furoate [Nasonex] 1 spray NS Q12 #1 spray.pump


Forms:  Return to Work


Referrals: 


BREANNA PAUL MD [Primary Care Provider] - Follow up as needed





I personally performed the services described in the documentation, reviewed and

edited the documentation which was dictated to the scribe in my presence, and it

accurately records my words and actions.

## 2020-01-23 NOTE — ER DOCUMENT REPORT
HPI





- HPI


Patient complains to provider of: ankle injury


Time Seen by Provider: 20 10:43


Onset: This morning


Onset/Duration: Sudden


Quality of pain: Achy


Pain Level: 2


Context: 





Patient states that she was going downstairs and rolled her left ankle.  Patient

with left lateral ankle tenderness.  Patient denies any other injury.


Associated Symptoms: Other - left ankle pain


Exacerbated by: Standing, Movement, Walking


Relieved by: Denies


Similar symptoms previously: No


Recently seen / treated by doctor: No





- ROS


ROS below otherwise negative: Yes


Systems Reviewed and Negative: Yes All other systems reviewed and negative





- REPRODUCTIVE


Reproductive: DENIES: Pregnant:





- MUSCULOSKELETAL


Musculoskeletal: REPORTS: Extremity pain, Swelling.  DENIES: Back Pain





- DERM


Skin Color: Normal


Skin Problems: None





Past Medical History





- General


Information source: Patient





- Social History


Smoking Status: Current Every Day Smoker


Frequency of alcohol use: None


Drug Abuse: None


Occupation: 


Lives with: Family


Family History: Reviewed & Not Pertinent, Hypertension, Malignancy, Thyroid 

Disfunction, Other - kidney disease


Patient has suicidal ideation: No


Patient has homicidal ideation: No





- Past Medical History


Cardiac Medical History: Reports: Hx Hypertension - during pregnancy


Musculoskeletal Medical History: Reports Hx Musculoskeletal Trauma


Psychiatric Medical History: Reports: Hx Anxiety, Hx Bipolar Disorder - no meds,

Hx Depression - no meds


Past Surgical History: Reports: Hx  Section, Hx Gynecologic Surgery, Hx 

Kidney (Renal Surgery)





- Immunizations


Immunizations up to date: Yes


Hx Diphtheria, Pertussis, Tetanus Vaccination: Yes


Hx Pneumococcal Vaccination: 10/01/14





Vertical Provider Document





- CONSTITUTIONAL


Agree With Documented VS: Yes


Exam Limitations: No Limitations


General Appearance: WD/WN, No Apparent Distress





- INFECTION CONTROL


TRAVEL OUTSIDE OF THE [place] IN LAST 30 DAYS: No





- HEENT


HEENT: Atraumatic, Normocephalic





- NECK


Neck: Normal Inspection





- RESPIRATORY


Respiratory: No Respiratory Distress





- CARDIOVASCULAR


Pulses: Normal: Dorsalis pedis





- BACK


Back: Normal Inspection





- MUSCULOSKELETAL/EXTREMETIES


Musculoskeletal/Extremeties: MAEW, Tender - left ankle tenderness over lateral 

malleolar area with 1+ edema, no deformity, No Edema





- NEURO


Level of Consciousness: Awake, Alert, Appropriate


Motor/Sensory: No Motor Deficit





- DERM


Integumentary: Warm, Dry, No Rash





Course





- Vital Signs


Vital signs: 


                                        











Temp Pulse Resp BP Pulse Ox


 


 98.4 F   72   16   114/72   100 


 


 20 10:44  20 10:44  20 10:44  20 10:44  20 10:44














- Diagnostic Test


Radiology reviewed: Image reviewed, Reports reviewed





Procedures





- Immobilization


  ** Left Ankle


Pre-Proc Neuro Vasc Exam: Normal


Immobilizer type: Ankle stirrup


Performed by: PCT


Post-Proc Neuro Vasc Exam: Normal


Alignment checked and good: Yes





Discharge





- Discharge


Clinical Impression: 


Left ankle sprain


Qualifiers:


 Encounter type: initial encounter Involved ligament of ankle: unspecified 

ligament Qualified Code(s): S93.402A - Sprain of unspecified ligament of left 

ankle, initial encounter





Condition: Stable


Disposition: HOME, SELF-CARE


Instructions:  Ankle Stirrup Splint ([facility]), Use of Crutches ([facility]), Ice & 

Elevation ([facility]), Sprained Ankle ([facility])


Additional Instructions: 


Return immediately for any new or worsening symptoms





Followup with your primary care provider, call tomorrow to make a followup 

appointment





Weightbearing as tolerated 





follow-up with orthopedics for any persistent pain or problems








Forms:  Return to Work


Referrals: 


BREANNA PAUL MD [Primary Care Provider] - Follow up as needed


LESTER BOOKER MD [ACTIVE PROVISIONAL STAFF] - Follow up as needed


[facility] [Provider Group] - Follow up as needed

## 2020-01-23 NOTE — RADIOLOGY REPORT (SQ)
EXAM DESCRIPTION:  ANKLE LEFT COMPLETE



COMPLETED DATE/TIME:  1/23/2020 11:08 am



REASON FOR STUDY:  rolled ankle, missed a step



COMPARISON:  None.



NUMBER OF VIEWS:  Three views.



TECHNIQUE:  AP, lateral, and oblique radiographic images acquired of the left ankle.



LIMITATIONS:  None.



FINDINGS:  MINERALIZATION: Normal.

BONES: No acute fracture or dislocation.  No worrisome bone lesions.

JOINTS: No effusions.

SOFT TISSUES: No soft tissue swelling. No foreign body.

OTHER: No other significant finding.



IMPRESSION:  NEGATIVE STUDY OF THE LEFT ANKLE. NO RADIOGRAPHIC EVIDENCE OF ACUTE INJURY.



TECHNICAL DOCUMENTATION:  JOB ID:  [id number]

 2011 [employer]- All Rights Reserved



Reading location - IP/workstation name: [email or web address]

## 2020-02-15 NOTE — ER DOCUMENT REPORT
ED Medical Screen (RME)





- General


Chief Complaint: Abdominal Pain


Stated Complaint: ABDOMINAL PAIN,VOMITING


Primary Care Provider: 


BREANNA PAUL MD [Primary Care Provider] - Follow up as needed


Notes: 








Patient is a 29-year-old white female with no reported past medical history who 

presents to the emergency department today with a chief complaint of upper 

abdominal discomfort that began about 2 days ago.  She describes it as a cra

mping pain in the upper abdomen.  States is associated with nausea.  She had an 

unknown name nausea medication that she took x2 at home to get some mild relief.

 She states tonight at work she could not handle it anymore so she came for 

evaluation.  She does admit that her son was recently sick with a "stomach 

virus" but she states this feels different.  She denies any vomiting or 

diarrhea.  Denies any chest pain or shortness of breath.  She admits to 

subjective fevers at home.  No recent travel.  Denies chance of pregnancy.





I have treated and performed a rapid initial assessment of this patient.  A 

comprehensive ED assessment and evaluation of the patient, analysis of test 

results and completion of medical decision making process will be conducted by 

additional ED providers.





PHYSICAL EXAMINATION:





GENERAL: Well-appearing, well-nourished and in no acute distress.  A&Ox4.  

Answers questions appropriately.


TRAVEL OUTSIDE OF THE [place] IN LAST 30 DAYS: No





- Related Data


Allergies/Adverse Reactions: 


                                        





aspirin [Aspirin] Allergy (Intermediate, Verified 20 10:45)


   Facial swelling


diphenhydramine HCl [From Benadryl] Allergy (Intermediate, Verified 20 

10:45)


   Facial swelling


Sulfa (Sulfonamide Antibiotics) Allergy (Verified 20 10:45)


   unknown











Past Medical History





- Past Medical History


Cardiac Medical History: Reports: Hx Hypertension - during pregnancy


   Denies: Hx Congestive Heart Failure, Hx Heart Attack


Pulmonary Medical History: 


   Denies: Hx Asthma, Hx Bronchitis, Hx COPD, Hx Pneumonia, Hx Tuberculosis


Neurological Medical History: Denies: Hx Seizures, Hx Parkinson's Disease


Renal/ Medical History: Denies: Hx End Stage Renal Disease, Hx Kidney Stones, 

Hx Peritoneal Dialysis


GI Medical History: Denies: Hx Cirrhosis, Hx Gastroesophageal Reflux Disease, Hx

Ulcer


Musculoskeltal Medical History: Denies Hx Arthritis, Denies Hx Multiple 

Sclerosis, Reports Hx Musculoskeletal Trauma


Psychiatric Medical History: Reports: Hx Anxiety, Hx Bipolar Disorder - no meds,

Hx Depression - no meds


   Denies: Hx Schizophrenia


Past Surgical History: Reports: Hx  Section, Hx Gynecologic Surgery, Hx 

Kidney (Renal Surgery)





- Immunizations


Immunizations up to date: Yes


Hx Diphtheria, Pertussis, Tetanus Vaccination: Yes





Physical Exam





- Vital signs


Vitals: 





                                        











Temp Pulse Resp BP Pulse Ox


 


 99.2 F   71   20   153/102 H  100 


 


 20 23:49  20 23:49  20 23:49  20 23:49  20 23:49














Course





- Vital Signs


Vital signs: 





                                        











Temp Pulse Resp BP Pulse Ox


 


 99.2 F   71   20   153/102 H  100 


 


 20 23:49  20 23:49  20 23:49  20 23:49  20 23:49














Doctor's Discharge





- Discharge


Referrals: 


BREANNA PAUL MD [Primary Care Provider] - Follow up as needed

## 2020-02-15 NOTE — ER DOCUMENT REPORT
Entered by SRAVANTHI RAMIREZ SCRIBE  02/15/20 0330 





Acting as scribe for:ALLISON PAUL IV, MD





ED GI/





- General


Chief Complaint: Abdominal Pain


Stated Complaint: ABDOMINAL PAIN,VOMITING


Time Seen by Provider: 02/15/20 03:28


Primary Care Provider: 


BREANNA PAUL MD [Primary Care Provider] - Follow up as needed


Mode of Arrival: Ambulatory


Information source: Patient


Notes: 





This 29 year old female patient presents to the ED today with complaints of 

upper abdominal pain/cramping with associated intermittent nausea that started 

x2 days ago. Patient states that took x2 pills of nausea medication left over 

from a x2 month old prescription to get some relief. Patient states that she 

could not tolerate the pain while she was at work tonight, so she came to the ED

for evaluation. Patient notes that her son had a "stomach virus" for the past 

x3-4 days with vomiting and diarrhea, but states that she may have something 

different. Patient states that the Reglan she received in triage provided no 

relief. Patient reports vomiting, body aches, and subjective fevers. Patient 

denies chest pain, shortness of breath, or diarrhea. 








TRAVEL OUTSIDE OF THE [place] IN LAST 30 DAYS: No





- Related Data


Allergies/Adverse Reactions: 


                                        





aspirin [Aspirin] Allergy (Intermediate, Verified 20 10:45)


   Facial swelling


diphenhydramine HCl [From Benadryl] Allergy (Intermediate, Verified 20 

10:45)


   Facial swelling


Sulfa (Sulfonamide Antibiotics) Allergy (Verified 20 10:45)


   unknown








Home Medications: suboxone.  vyvanse





Past Medical History





- General


Information source: Patient





- Social History


Smoking Status: Never Smoker


Cigarette use (# per day): No


Chew tobacco use (# tins/day): No


Smoking Education Provided: No


Family History: Reviewed & Not Pertinent, Hypertension, Malignancy, Thyroid 

Disfunction, Other - kidney disease


Patient has suicidal ideation: No


Patient has homicidal ideation: No





- Past Medical History


Cardiac Medical History: Reports: Hx Hypertension - during pregnancy


Musculoskeletal Medical History: Reports Hx Musculoskeletal Trauma


Psychiatric Medical History: Reports: Hx Anxiety, Hx Bipolar Disorder - no meds,

Hx Depression - no meds


Past Surgical History: Reports: Hx  Section - x3, Hx Gynecologic 

Surgery, Hx Kidney (Renal Surgery)





- Immunizations


Immunizations up to date: Yes


Hx Diphtheria, Pertussis, Tetanus Vaccination: Yes


Hx Pneumococcal Vaccination: 10/01/14





Review of Systems





- Review of Systems


Constitutional: See HPI, Fever


EENT: No symptoms reported


Cardiovascular: See HPI.  denies: Chest pain


Respiratory: See HPI.  denies: Short of breath


Gastrointestinal: See HPI, Abdominal pain, Nausea, Vomiting.  denies: Diarrhea


Genitourinary: No symptoms reported


Female Genitourinary: No symptoms reported


Musculoskeletal: See HPI, Other - Body aches


Skin: No symptoms reported


Hematologic/Lymphatic: No symptoms reported


Neurological/Psychological: No symptoms reported


-: Yes All other systems reviewed and negative





Physical Exam





- Vital signs


Vitals: 


                                        











Temp Pulse Resp BP Pulse Ox


 


 99.2 F   71   20   153/102 H  100 


 


 20 23:49  20 23:49  20 23:49  20 23:49  20 23:49











Interpretation: Hypertensive





- General


General appearance: Alert





- HEENT


Head: Normocephalic, Atraumatic


Eyes: Normal


Pupils: PERRL





- Respiratory


Respiratory status: No respiratory distress


Chest status: Nontender


Breath sounds: Normal


Chest palpation: Normal





- Cardiovascular


Rhythm: Regular


Heart sounds: Normal auscultation


Murmur: No


Hamman's crunch: No


Gallop: None auscultated





- Abdominal


Inspection: Normal


Distension: No distension


Bowel sounds: Normal


Tenderness: Tender - Tender to palpate in epigastric region.


Organomegaly: No organomegaly





- Back


Back: Normal, Nontender





- Extremities


General upper extremity: Normal inspection


General lower extremity: Normal inspection





- Neurological


Neuro grossly intact: Yes





- Psychological


Associated symptoms: Normal affect, Normal mood





- Skin


Skin Temperature: Warm


Skin Moisture: Dry


Skin Color: Normal





Course





- Re-evaluation


Re-evalutation: 





02/15/20 05:41


Results of ED MSE discussed with patient.  Patient states she is feeling better 

after GI cocktail.  All questions were answered prior to discharge.  Emergency 

signs and symptoms, reasons to return to the emergency department discussed with

patient.





- Vital Signs


Vital signs: 


                                        











Temp Pulse Resp BP Pulse Ox


 


 98.0 F   58 L  21 H  125/83   99 


 


 02/15/20 02:34  02/15/20 02:34  02/15/20 05:02  02/15/20 05:02  02/15/20 05:02














- Laboratory


Result Diagrams: 


                                 02/15/20 00:47





                                 02/15/20 00:47


Laboratory results interpreted by me: 


                                        











  02/15/20 02/15/20





  00:47 00:47


 


Hgb  11.8 L 


 


MCV  78 L 


 


MCH  25.4 L 


 


RDW  14.6 H 


 


Urine Blood   MODERATE H


 


Urine Nitrite   POSITIVE H


 


Ur Leukocyte Esterase   SMALL H














Discharge





- Discharge


Clinical Impression: 


Acute gastritis


Qualifiers:


 Gastritis type: unspecified gastritis Gastritis bleeding: without bleeding 

Qualified Code(s): K29.00 - Acute gastritis without bleeding





Condition: Good


Disposition: HOME, SELF-CARE


Instructions:  Abdominal Pain ([facility])


Additional Instructions: 


Return to the Emergency Department without delay if any worse.











HOME CARE INSTRUCTIONS & INFORMATION:  Thank you for choosing us for your 

medical needs. We hope you're satisfied with the care you received.  After you 

leave, you must properly care for your problem and, at the same time, observe 

its progress.  Any condition can change.  Some illnesses can change rapidly over

hours or days.  If your condition worsens, return to the Emergency Department or

see your physician promptly.





ABOUT YOUR X-RAYS AND EKG'S:   If you had an EKG or X-rays taken, they have been

read by the Emergency Physician. The X-rays and EKG's will also be read by a 

Radiologist or Cardiologist within 24 hours.  If discrepancies are noted, you 

will be notified by telephone.  Please be certain the ED has a correct telephone

number & address where you can be reached.  Also, realize that some fractures or

abnormalities do not show up on initial X-rays.  If your symptoms continue, see 

your physician.





ABOUT YOUR LABORATORY TEST:   If you had laboratory tests, the results have been

reviewed by the Emergency Physician.  Some test results (for example cultures) 

may not be available for several days.  You will be contacted if any test result

shows you need additional treatment.  Please be certain the ED has a correct 

telephone number and address where you can be reached.





ABOUT YOUR MEDICATIONS:  You will receive instructions on how to take your medi

cine on the prescription label you receive.  Additional information may be 

provided by the Pharmacy.  If you have questions afterwards, call the ED for 

clarification or further instructions.  Some prescribed medications may cause 

drowsiness.  Do not perform tasks such as driving a car or operating machinery 

without consulting your Pharmacist.  If you feel you need a refill of pain 

medication, your condition will need re-evaluation.  Please do not call for a 

refill of any medication.





ABOUT YOUR SIGNATURE:   Signature of this document acknowledges to followin. Understanding that you received emergency treatment and that you may be 

   released before al medical problems are known or treated. Please be certain  

   the ED has a correct phone number & address where you can be reached.


   2. Acknowledgement that you will arrange for follow-up care as recommended.


   3. Authorization for the Emergency Physician to provide information to your 

follow-up Physician in order to maximize your care.





AT ANY TIME, IF YOUR SYMPTOMS CHANGE SIGNIFICANTLY OR WORSEN OR YOU DEVELOP NEW 

SYMPTOMS, RETURN TO THE EMERGENCY DEPARTMENT IMMEDIATELY FOR RE-EVALUATION.





OUR GOAL IS TO PROVIDE EXCELLENT MEDICAL CARE!





WE HOPE THAT WE HAVE MET YOUR EXPECTATIONS DURING YOUR EMERGENCY DEPARTMENT 

VISIT AND THAT YOU FEEL YOU HAVE RECEIVED EXCELLENT CARE!











Referrals: 


BREANNA PAUL MD [Primary Care Provider] - Follow up as needed





I personally performed the services described in the documentation, reviewed and

edited the documentation which was dictated to the scribe in my presence, and it

accurately records my words and actions.

## 2020-09-11 NOTE — ER DOCUMENT REPORT
ED Flu Like





- General


Chief Complaint: Nausea/Vomiting


Stated Complaint: CHEST PAIN, FEVER, CHILLS, NAUSEA


Time Seen by Provider: 20 21:15


Primary Care Provider: 


BREANNA PAUL MD [Primary Care Provider] - Follow up as needed


Notes: 





CHIEF COMPLAINT: Multiple complaints





HPI: 29-year-old female presenting to the emergency department with multiple 

complaints.  Patient states 4 days ago she developed generalized body ache.  

Sore throat.  Painful swallowing.  No cough chest pain shortness of breath.  Has

had nausea but no vomiting or diarrhea.  No definite fevers in the last 3 days 

but states she had a fever today with a sore throat became worse.  Patient is 

concerned about COVID-19





ROS: See HPI - all other systems were reviewed and are otherwise negative


Constitutional: Positive fever 


Eyes: no drainage, no blurred vision


ENT: no runny nose, positive sore throat


Cardiovascular:  no chest pain 


Resp: no SOB, no cough


GI: no vomiting, no diarrhea, no abdominal pain, positive nausea


: no dysuria


Integumentary: no rash 


Allergy: no hives 


Musculoskeletal: no extremity pain or swelling 


Neurological: no numbness/tingling, no weakness





MEDICATIONS: I agree with the patient medications as charted by the RN.





ALLERGIES: I agree with the allergies as charted by the RN.





PAST MEDICAL HISTORY/PAST SURGICAL HISTORY: Reviewed and agree as charted by RN.





SOCIAL HISTORY: Reviewed and agree as charted by RN.





FAMILY HISTORY: No significant familial comorbid conditions directly related to 

patient complaint





EXAM:


Reviewed vital signs as charted by RN.


CONSTITUTIONAL: Alert and oriented and responds appropriately to questions. 

Well-appearing; well-nourished


HEAD: Normocephalic; atraumatic


EYES: PERRL; Conjunctivae clear, sclerae non-icteric


ENT: normal nose; no rhinorrhea; moist mucous membranes; pharynx without lesions

noted, no uvula edema or deviation, no tonsillar hypertrophy, phonation normal


NECK: Supple without meningismus; non-tender; no cervical lymphadenopathy, no 

masses


CARD: RRR; no murmurs, no clicks, no rubs, no gallops; symmetric distal pulses


RESP: Normal chest excursion without splinting or tachypnea; breath sounds clear

and equal bilaterally; no wheezes, no rhonchi, no rales, pulse oximetry 98% on 

room air not hypoxic


ABD/GI: Normal bowel sounds; non-distended; soft, non-tender, no rebound, no 

guarding; no palpable organomegaly or masses.


BACK:  The back appears normal and is non-tender to palpation, there is no CVA 

tenderness


EXT: Normal ROM in all joints; non-tender to palpation; no cyanosis, no 

effusions, no edema   


SKIN: Normal color for age and race; warm; dry; good turgor; no acute lesions 

noted


NEURO: Moves all extremities equally; Motor and sensory function intact 


PSYCH: The patient's mood and manner are appropriate. Grooming and personal hyg

iene are appropriate.





MDM: 29-year-old female with multiple complaints including body aches fever sore

throat she is concerned for COVID.  Initial screening labs placed by the triage 

process.  EKG normal sinus rhythm with a ventricular heart rate of 87, , 

, QTc 429.  Interpreted by emergency department physician.  There are 

nonspecific T wave flattening across most leads that are consistent with 

patient's EKG on review from 2017.  She has no active chest pain.  Her 

complaint primarily is sore throat and body ache.  COVID-19 test was ordered and

sent.  Chest x-ray does not show evidence of infiltrate per the radiologist.  

Initial screening labs show very mild leukocytosis no other significant 

abnormalities.  We will send a rapid strep on the patient.  If negative antic

ipate discharge home with COVID-19 precautions.  If positive will treat with 

antibiotics


TRAVEL OUTSIDE OF THE [place] IN LAST 30 DAYS: No





- Related Data


Allergies/Adverse Reactions: 


                                        





aspirin [Aspirin] Allergy (Intermediate, Verified 20 10:45)


   Facial swelling


diphenhydramine HCl [From Benadryl] Allergy (Intermediate, Verified 20 

10:45)


   Facial swelling


Sulfa (Sulfonamide Antibiotics) Allergy (Verified 20 10:45)


   unknown








Home Medications: symboxin





Past Medical History





- Social History


Smoking Status: Current Every Day Smoker


Chew tobacco use (# tins/day): No


Frequency of alcohol use: None


Drug Abuse: None


Family History: Reviewed & Not Pertinent, Hypertension, Malignancy, Thyroid 

Disfunction, Other - kidney disease





- Past Medical History


Cardiac Medical History: Reports: Hx Hypertension - during pregnancy


   Denies: Hx Congestive Heart Failure, Hx Heart Attack


Pulmonary Medical History: 


   Denies: Hx Asthma, Hx Bronchitis, Hx COPD, Hx Pneumonia, Hx Tuberculosis


Neurological Medical History: Denies: Hx Seizures, Hx Parkinson's Disease


Renal/ Medical History: Denies: Hx End Stage Renal Disease, Hx Kidney Stones, 

Hx Peritoneal Dialysis


GI Medical History: Denies: Hx Cirrhosis, Hx Gastroesophageal Reflux Disease, Hx

Ulcer


Musculoskeletal Medical History: Denies Hx Arthritis, Denies Hx Multiple 

Sclerosis, Reports Hx Musculoskeletal Trauma


Psychiatric Medical History: Reports: Hx Anxiety, Hx Bipolar Disorder - no meds,

Hx Depression - no meds


   Denies: Hx Schizophrenia


Past Surgical History: Reports: Hx  Section - x3, Hx Gynecologic 

Surgery, Hx Kidney (Renal Surgery)





- Immunizations


Immunizations up to date: Yes


Hx Diphtheria, Pertussis, Tetanus Vaccination: Yes


Hx Pneumococcal Vaccination: 10/01/14





Physical Exam





- Vital signs


Vitals: 


                                        











Temp


 


 100.2 F 


 


 20 21:16














Course





- Re-evaluation


Re-evalutation: 





20 00:13


Rapid strep is negative will discharge person under investigation.





- Vital Signs


Vital signs: 


                                        











Temp Pulse Resp BP Pulse Ox


 


 100.2 F   105 H  18   150/55 H  100 


 


 20 21:16  20 21:20  20 21:20  20 21:20  20 21:20














- Laboratory


Result Diagrams: 


                                 20 22:37





                                 20 22:37


Laboratory results interpreted by me: 


                                        











  20





  22:37 22:37


 


WBC  12.1 H 


 


RDW  14.9 H 


 


Absolute Neuts (auto)  9.5 H 


 


Seg Neutrophils %  78.5 H 


 


Sodium   136.1 L


 


BUN   4 L














Discharge





- Discharge


Clinical Impression: 


 Myalgia, Person under investigation for COVID-19





Vomiting


Qualifiers:


 Vomiting type: unspecified Vomiting Intractability: non-intractable Nausea 

presence: with nausea Qualified Code(s): R11.2 - Nausea with vomiting, 

unspecified





Condition: Stable


Disposition: HOME, SELF-CARE


Additional Instructions: 


Take Zofran for any recurrent nausea or vomiting.  Your rapid strep test and 

other lab work tonight were negative.  Chest x-ray was negative.  You are 

considered a person under investigation for COVID-19 at this time, self 

quarantine at home for the next 48 to 72 hours pending her test result which 

normally takes 2 to 5 days.  You will receive a call from someone at the 

hospital about your test result


Prescriptions: 


Ondansetron [Zofran Odt 4 mg Tablet] 1 - 2 tab PO Q4H PRN #15 tab.rapdis


 PRN Reason: For Nausea/Vomiting


Referrals: 


BREANNA PAUL MD [Primary Care Provider] - Follow up as needed

## 2020-09-11 NOTE — RADIOLOGY REPORT (SQ)
CLINICAL INDICATION: chest pain; fever.



TECHNIQUE: A single portable AP  view was obtained of the chest

at 2257 hours.



COMPARISON: October 1, 2017.



FINDINGS: The cardiomediastinal  silhouette is normal. The lungs

are grossly clear. No evidence of effusion or pneumothorax. The

visualized bones are unremarkable.



IMPRESSION: No evidence of active intrathoracic disease.

## 2020-09-11 NOTE — ER DOCUMENT REPORT
ED Medical Screen (RME)





- General


Chief Complaint: Chest Pain


Stated Complaint: CHEST PAIN, FEVER, CHILLS, NAUSEA


Time Seen by Provider: 20 21:15


Primary Care Provider: 


BREANNA PAUL MD [Primary Care Provider] - Follow up as needed


Notes: 





Patient is a 29-year-old female who presents to the emergency department with a 

chief complaint of chest pain, generalized body aches, and a fever.  Patient 

states that her symptoms started about 4 days ago.  Patient admits to 

rhinorrhea.  She states that she called her primary care provider on the first 

day of her symptoms and she states, "they said do not worry about it."  Patient 

states that she works at [employer].  She states that she does not know if 

she has had any contact with anyone who has tested positive for the coronavirus,

but she states, "I think to have the virus."





Exam: Rhinorrhea noted.





I have greeted and performed a rapid initial assessment of this patient.  A 

comprehensive ED assessment and evaluation of the patient, analysis of test 

results and completion of medical decision making process will be conducted by 

an additional ED providers.


TRAVEL OUTSIDE OF THE [place] IN LAST 30 DAYS: No





- Related Data


Allergies/Adverse Reactions: 


                                        





aspirin [Aspirin] Allergy (Intermediate, Verified 20 10:45)


   Facial swelling


diphenhydramine HCl [From Benadryl] Allergy (Intermediate, Verified 20 

10:45)


   Facial swelling


Sulfa (Sulfonamide Antibiotics) Allergy (Verified 20 10:45)


   unknown











Past Medical History





- Past Medical History


Cardiac Medical History: Reports: Hx Hypertension - during pregnancy


   Denies: Hx Congestive Heart Failure, Hx Heart Attack


Pulmonary Medical History: 


   Denies: Hx Asthma, Hx Bronchitis, Hx COPD, Hx Pneumonia, Hx Tuberculosis


Neurological Medical History: Denies: Hx Seizures, Hx Parkinson's Disease


Renal/ Medical History: Denies: Hx End Stage Renal Disease, Hx Kidney Stones, 

Hx Peritoneal Dialysis


GI Medical History: Denies: Hx Cirrhosis, Hx Gastroesophageal Reflux Disease, Hx

Ulcer


Musculoskeltal Medical History: Denies Hx Arthritis, Denies Hx Multiple 

Sclerosis, Reports Hx Musculoskeletal Trauma


Psychiatric Medical History: Reports: Hx Anxiety, Hx Bipolar Disorder - no meds,

Hx Depression - no meds


   Denies: Hx Schizophrenia


Past Surgical History: Reports: Hx  Section - x3, Hx Gynecologic 

Surgery, Hx Kidney (Renal Surgery)





- Immunizations


Immunizations up to date: Yes


Hx Diphtheria, Pertussis, Tetanus Vaccination: Yes





Doctor's Discharge





- Discharge


Referrals: 


BREANNA PAUL MD [Primary Care Provider] - Follow up as needed

## 2020-09-14 NOTE — EKG REPORT
SEVERITY:- ABNORMAL ECG -

SINUS RHYTHM

NONSPECIFIC T ABNORMALITIES, ANT-LAT LEADS

:

Confirmed by: Gilmer Story MD 14-Sep-2020 02:19:20